# Patient Record
Sex: FEMALE | Race: BLACK OR AFRICAN AMERICAN | NOT HISPANIC OR LATINO | URBAN - METROPOLITAN AREA
[De-identification: names, ages, dates, MRNs, and addresses within clinical notes are randomized per-mention and may not be internally consistent; named-entity substitution may affect disease eponyms.]

---

## 2017-01-05 ENCOUNTER — INPATIENT (INPATIENT)
Facility: HOSPITAL | Age: 27
LOS: 3 days | Discharge: HOME | End: 2017-01-09
Attending: HOSPITALIST | Admitting: HOSPITALIST
Payer: MEDICAID

## 2017-01-05 VITALS
SYSTOLIC BLOOD PRESSURE: 187 MMHG | DIASTOLIC BLOOD PRESSURE: 142 MMHG | TEMPERATURE: 99 F | HEART RATE: 109 BPM | RESPIRATION RATE: 18 BRPM | OXYGEN SATURATION: 100 %

## 2017-01-05 DIAGNOSIS — I77.0 ARTERIOVENOUS FISTULA, ACQUIRED: Chronic | ICD-10-CM

## 2017-01-05 LAB
ALBUMIN SERPL ELPH-MCNC: 3.4 G/DL — SIGNIFICANT CHANGE UP (ref 3.3–5)
ALP SERPL-CCNC: 55 U/L — SIGNIFICANT CHANGE UP (ref 40–120)
ALT FLD-CCNC: 9 U/L — SIGNIFICANT CHANGE UP (ref 4–33)
APPEARANCE UR: SIGNIFICANT CHANGE UP
AST SERPL-CCNC: 15 U/L — SIGNIFICANT CHANGE UP (ref 4–32)
BACTERIA # UR AUTO: SIGNIFICANT CHANGE UP
BASE EXCESS BLDV CALC-SCNC: 2.6 MMOL/L — SIGNIFICANT CHANGE UP
BASOPHILS # BLD AUTO: 0.01 K/UL — SIGNIFICANT CHANGE UP (ref 0–0.2)
BASOPHILS NFR BLD AUTO: 0.1 % — SIGNIFICANT CHANGE UP (ref 0–2)
BILIRUB SERPL-MCNC: 0.3 MG/DL — SIGNIFICANT CHANGE UP (ref 0.2–1.2)
BILIRUB UR-MCNC: NEGATIVE — SIGNIFICANT CHANGE UP
BLOOD GAS VENOUS - CREATININE: 8.1 MG/DL — HIGH (ref 0.5–1.3)
BLOOD UR QL VISUAL: NEGATIVE — SIGNIFICANT CHANGE UP
BUN SERPL-MCNC: 52 MG/DL — HIGH (ref 7–23)
CALCIUM SERPL-MCNC: 8.9 MG/DL — SIGNIFICANT CHANGE UP (ref 8.4–10.5)
CHLORIDE BLDV-SCNC: 101 MMOL/L — SIGNIFICANT CHANGE UP (ref 96–108)
CHLORIDE SERPL-SCNC: 97 MMOL/L — LOW (ref 98–107)
CO2 SERPL-SCNC: 23 MMOL/L — SIGNIFICANT CHANGE UP (ref 22–31)
COLOR SPEC: SIGNIFICANT CHANGE UP
CREAT SERPL-MCNC: 7.74 MG/DL — HIGH (ref 0.5–1.3)
EOSINOPHIL # BLD AUTO: 0.08 K/UL — SIGNIFICANT CHANGE UP (ref 0–0.5)
EOSINOPHIL NFR BLD AUTO: 0.7 % — SIGNIFICANT CHANGE UP (ref 0–6)
GAS PNL BLDV: 135 MMOL/L — LOW (ref 136–146)
GLUCOSE BLDV-MCNC: 75 — SIGNIFICANT CHANGE UP (ref 70–99)
GLUCOSE SERPL-MCNC: 75 MG/DL — SIGNIFICANT CHANGE UP (ref 70–99)
GLUCOSE UR-MCNC: NEGATIVE — SIGNIFICANT CHANGE UP
HCG SERPL-ACNC: < 5 MIU/ML — SIGNIFICANT CHANGE UP
HCO3 BLDV-SCNC: 25 MMOL/L — SIGNIFICANT CHANGE UP (ref 20–27)
HCT VFR BLD CALC: 39.1 % — SIGNIFICANT CHANGE UP (ref 34.5–45)
HCT VFR BLDV CALC: 40.1 % — SIGNIFICANT CHANGE UP (ref 34.5–45)
HGB BLD-MCNC: 12.8 G/DL — SIGNIFICANT CHANGE UP (ref 11.5–15.5)
HGB BLDV-MCNC: 13 G/DL — SIGNIFICANT CHANGE UP (ref 11.5–15.5)
IMM GRANULOCYTES NFR BLD AUTO: 1.3 % — SIGNIFICANT CHANGE UP (ref 0–1.5)
KETONES UR-MCNC: NEGATIVE — SIGNIFICANT CHANGE UP
LACTATE BLDV-MCNC: 1.4 MMOL/L — SIGNIFICANT CHANGE UP (ref 0.5–2)
LEUKOCYTE ESTERASE UR-ACNC: NEGATIVE — SIGNIFICANT CHANGE UP
LIDOCAIN IGE QN: 26.8 U/L — SIGNIFICANT CHANGE UP (ref 7–60)
LYMPHOCYTES # BLD AUTO: 0.9 K/UL — LOW (ref 1–3.3)
LYMPHOCYTES # BLD AUTO: 8 % — LOW (ref 13–44)
MCHC RBC-ENTMCNC: 28.4 PG — SIGNIFICANT CHANGE UP (ref 27–34)
MCHC RBC-ENTMCNC: 32.7 % — SIGNIFICANT CHANGE UP (ref 32–36)
MCV RBC AUTO: 86.7 FL — SIGNIFICANT CHANGE UP (ref 80–100)
MONOCYTES # BLD AUTO: 0.9 K/UL — SIGNIFICANT CHANGE UP (ref 0–0.9)
MONOCYTES NFR BLD AUTO: 8 % — SIGNIFICANT CHANGE UP (ref 2–14)
NEUTROPHILS # BLD AUTO: 9.24 K/UL — HIGH (ref 1.8–7.4)
NEUTROPHILS NFR BLD AUTO: 81.9 % — HIGH (ref 43–77)
NITRITE UR-MCNC: NEGATIVE — SIGNIFICANT CHANGE UP
NON-SQ EPI CELLS # UR AUTO: <1 — SIGNIFICANT CHANGE UP
PCO2 BLDV: 44 MMHG — SIGNIFICANT CHANGE UP (ref 41–51)
PH BLDV: 7.41 PH — SIGNIFICANT CHANGE UP (ref 7.32–7.43)
PH UR: 8 — SIGNIFICANT CHANGE UP (ref 4.6–8)
PLATELET # BLD AUTO: 228 K/UL — SIGNIFICANT CHANGE UP (ref 150–400)
PMV BLD: 9 FL — SIGNIFICANT CHANGE UP (ref 7–13)
PO2 BLDV: 27 MMHG — LOW (ref 35–40)
POTASSIUM BLDV-SCNC: 3.8 MMOL/L — SIGNIFICANT CHANGE UP (ref 3.4–4.5)
POTASSIUM SERPL-MCNC: 4.5 MMOL/L — SIGNIFICANT CHANGE UP (ref 3.5–5.3)
POTASSIUM SERPL-SCNC: 4.5 MMOL/L — SIGNIFICANT CHANGE UP (ref 3.5–5.3)
PROT SERPL-MCNC: 5.7 G/DL — LOW (ref 6–8.3)
PROT UR-MCNC: 500 — HIGH
RBC # BLD: 4.51 M/UL — SIGNIFICANT CHANGE UP (ref 3.8–5.2)
RBC # FLD: 16.9 % — HIGH (ref 10.3–14.5)
RBC CASTS # UR COMP ASSIST: SIGNIFICANT CHANGE UP (ref 0–?)
SAO2 % BLDV: 43.3 % — LOW (ref 60–85)
SODIUM SERPL-SCNC: 141 MMOL/L — SIGNIFICANT CHANGE UP (ref 135–145)
SP GR SPEC: 1.01 — SIGNIFICANT CHANGE UP (ref 1–1.03)
SQUAMOUS # UR AUTO: SIGNIFICANT CHANGE UP
TSH SERPL-MCNC: 0.9 UIU/ML — SIGNIFICANT CHANGE UP (ref 0.27–4.2)
UROBILINOGEN FLD QL: NORMAL E.U. — SIGNIFICANT CHANGE UP (ref 0.1–0.2)
WBC # BLD: 11.28 K/UL — HIGH (ref 3.8–10.5)
WBC # FLD AUTO: 11.28 K/UL — HIGH (ref 3.8–10.5)
WBC UR QL: SIGNIFICANT CHANGE UP (ref 0–?)

## 2017-01-05 RX ORDER — SODIUM CHLORIDE 9 MG/ML
1000 INJECTION INTRAMUSCULAR; INTRAVENOUS; SUBCUTANEOUS ONCE
Qty: 0 | Refills: 0 | Status: COMPLETED | OUTPATIENT
Start: 2017-01-05 | End: 2017-01-05

## 2017-01-05 RX ORDER — LABETALOL HCL 100 MG
10 TABLET ORAL ONCE
Qty: 0 | Refills: 0 | Status: COMPLETED | OUTPATIENT
Start: 2017-01-05 | End: 2017-01-05

## 2017-01-05 RX ORDER — ONDANSETRON 8 MG/1
4 TABLET, FILM COATED ORAL ONCE
Qty: 0 | Refills: 0 | Status: COMPLETED | OUTPATIENT
Start: 2017-01-05 | End: 2017-01-05

## 2017-01-05 RX ORDER — HYDROMORPHONE HYDROCHLORIDE 2 MG/ML
1 INJECTION INTRAMUSCULAR; INTRAVENOUS; SUBCUTANEOUS ONCE
Qty: 0 | Refills: 0 | Status: DISCONTINUED | OUTPATIENT
Start: 2017-01-05 | End: 2017-01-05

## 2017-01-05 RX ADMIN — HYDROMORPHONE HYDROCHLORIDE 1 MILLIGRAM(S): 2 INJECTION INTRAMUSCULAR; INTRAVENOUS; SUBCUTANEOUS at 22:30

## 2017-01-05 RX ADMIN — SODIUM CHLORIDE 1000 MILLILITER(S): 9 INJECTION INTRAMUSCULAR; INTRAVENOUS; SUBCUTANEOUS at 22:10

## 2017-01-05 RX ADMIN — Medication 10 MILLIGRAM(S): at 23:54

## 2017-01-05 RX ADMIN — ONDANSETRON 4 MILLIGRAM(S): 8 TABLET, FILM COATED ORAL at 22:10

## 2017-01-05 RX ADMIN — HYDROMORPHONE HYDROCHLORIDE 1 MILLIGRAM(S): 2 INJECTION INTRAMUSCULAR; INTRAVENOUS; SUBCUTANEOUS at 22:12

## 2017-01-05 NOTE — ED PROVIDER NOTE - PROGRESS NOTE DETAILS
Spoke with nephrology on call to inform then the patient missed her dialysis appointment today as the patient is followed by house renal. Nephrology is aware and will dialyze patient inpatient.

## 2017-01-05 NOTE — ED PROVIDER NOTE - MEDICAL DECISION MAKING DETAILS
27 yo female with PMH of multiple c. diff colitis, lupus nephropathy, lupus, presents to the ED with three days of copious diarrhea, nausea, vomiting, body aches, and malaise. Initial plan: CXR upright, KUB, labs, IV fluids, pain control, zofran, UA, c. diff culture

## 2017-01-05 NOTE — ED PROVIDER NOTE - ATTENDING CONTRIBUTION TO CARE
Pt was seen and evaluated by me. Pt states having a history of ESRD secondary to SLE on HD TuThSat with recent diarrhea over the past 2 days. Pt states over the past 2 days having abd pain with watery diarrhea consistent with previous episodes of C.diff. Pt also notes she missed dialysis today. Pt denies any fever, chills, SOB, or chest pain. Pt admits to lower abd pain with episodes of nausea and vomiting. Lungs CTA b/l. RRR. Abd soft, non-distended. + thrill to right forearm.

## 2017-01-05 NOTE — ED ADULT NURSE NOTE - OBJECTIVE STATEMENT
rec'd pt aaox3 in room 9 c/o 10/10 abd pain, states it started on Tuesday but has progressively worsened this morning, pt. reports 5 episodes of loose watery stool and 4 episodes of vomiting, states she's has this kind of pain in the past and was previously dx w/ c diff and placed on antibiotics.  Pt. denies CP/SOB, reports more frequent urination that usual.  Pt. w/ an AV fistula in the R arm, gets dialysis on T, TH, Sat, missed her dialysis treatment this morning.  Pt. presently hypertensive, states she takes numerous meds for HBP but was not able to hold them down this morning.  Pt. dx w/ lupus, states she's complaint w/ her meds but feels like she may be having a flare up.  20G IV saline lock placed in the L AC, labs drawn and sent as ordered.  Will continue to monitor.

## 2017-01-05 NOTE — ED PROVIDER NOTE - OBJECTIVE STATEMENT
25 yo female with PMH of multiple c. diff colitis, lupus nephropathy, lupus, presents to the ED with three days of copious diarrhea, nausea, vomiting, body aches, and malaise. She states she began having cramping abdominal pain on Tuesday that progressed to diarrhea, nausea, vomiting, back pain and pain of multiple joints that "feels like a mild lupus flair up." Patient denies LOC, chest pain, cough, palpitations, SOB, headache, trauma, ingestion of suspicious foods, recent travel, sick contacts, blood per mouth or rectum, constipation.

## 2017-01-05 NOTE — ED PROVIDER NOTE - CHPI ED SYMPTOMS POS
DEHYDRATION/EDEMA/DIARRHEA/TENDERNESS/CRAMPS/WEAKNESS/PAIN/ABDOMINAL DISTENSION/CHILLS/LOSS OF APPETITE/DECREASED EATING/DRINKING/NAUSEA/VOMITING

## 2017-01-05 NOTE — ED ADULT TRIAGE NOTE - CHIEF COMPLAINT QUOTE
pt has had cdiff multiple times, last episode in september. c.o abdominal pain, diarrhea, vomiting this week. states diarrhea has become uncontrollable and feels similar to past cdiff. bp elevated in triage, pt on dialysis, T TH SAT, missed treatment tonight.  pmh lupus, kidney failure.

## 2017-01-05 NOTE — ED PROVIDER NOTE - ABDOMINAL TENDER
right lower quadrant/right upper quadrant/left upper quadrant/periumbilical/left lower quadrant/epigastric

## 2017-01-05 NOTE — ED PROVIDER NOTE - PMH
ESRD (end stage renal disease) on dialysis  2/2 microangiopathy  Essential Hypertension, Benign    Lupus nephritis    Microangiopathy    Systemic Lupus Erythematosus

## 2017-01-05 NOTE — ED PROVIDER NOTE - CHPI ED SYMPTOMS NEG
no coffee grounds emesis/no burning urination/no rectal bleeding/no dark tarry stools/no bloody emesis/no melena/no constipation/no dysuria/no blood in stool/no hematuria

## 2017-01-06 DIAGNOSIS — I16.0 HYPERTENSIVE URGENCY: ICD-10-CM

## 2017-01-06 DIAGNOSIS — N18.6 END STAGE RENAL DISEASE: ICD-10-CM

## 2017-01-06 DIAGNOSIS — A04.7 ENTEROCOLITIS DUE TO CLOSTRIDIUM DIFFICILE: ICD-10-CM

## 2017-01-06 DIAGNOSIS — M32.9 SYSTEMIC LUPUS ERYTHEMATOSUS, UNSPECIFIED: ICD-10-CM

## 2017-01-06 DIAGNOSIS — K52.9 NONINFECTIVE GASTROENTERITIS AND COLITIS, UNSPECIFIED: ICD-10-CM

## 2017-01-06 LAB
ALBUMIN SERPL ELPH-MCNC: 3.5 G/DL — SIGNIFICANT CHANGE UP (ref 3.3–5)
ALP SERPL-CCNC: 57 U/L — SIGNIFICANT CHANGE UP (ref 40–120)
ALT FLD-CCNC: 6 U/L — SIGNIFICANT CHANGE UP (ref 4–33)
AST SERPL-CCNC: 14 U/L — SIGNIFICANT CHANGE UP (ref 4–32)
BASOPHILS # BLD AUTO: 0.04 K/UL — SIGNIFICANT CHANGE UP (ref 0–0.2)
BASOPHILS NFR BLD AUTO: 0.3 % — SIGNIFICANT CHANGE UP (ref 0–2)
BILIRUB SERPL-MCNC: 0.3 MG/DL — SIGNIFICANT CHANGE UP (ref 0.2–1.2)
BUN SERPL-MCNC: 55 MG/DL — HIGH (ref 7–23)
C DIFF TOX GENS STL QL NAA+PROBE: DETECTED — HIGH
CALCIUM SERPL-MCNC: 9.3 MG/DL — SIGNIFICANT CHANGE UP (ref 8.4–10.5)
CHLORIDE SERPL-SCNC: 99 MMOL/L — SIGNIFICANT CHANGE UP (ref 98–107)
CO2 SERPL-SCNC: 23 MMOL/L — SIGNIFICANT CHANGE UP (ref 22–31)
CREAT SERPL-MCNC: 8 MG/DL — HIGH (ref 0.5–1.3)
EOSINOPHIL # BLD AUTO: 0.07 K/UL — SIGNIFICANT CHANGE UP (ref 0–0.5)
EOSINOPHIL NFR BLD AUTO: 0.5 % — SIGNIFICANT CHANGE UP (ref 0–6)
GLUCOSE SERPL-MCNC: 80 MG/DL — SIGNIFICANT CHANGE UP (ref 70–99)
HCT VFR BLD CALC: 39.2 % — SIGNIFICANT CHANGE UP (ref 34.5–45)
HGB BLD-MCNC: 12.7 G/DL — SIGNIFICANT CHANGE UP (ref 11.5–15.5)
IMM GRANULOCYTES NFR BLD AUTO: 1.1 % — SIGNIFICANT CHANGE UP (ref 0–1.5)
LYMPHOCYTES # BLD AUTO: 1.24 K/UL — SIGNIFICANT CHANGE UP (ref 1–3.3)
LYMPHOCYTES # BLD AUTO: 8.2 % — LOW (ref 13–44)
MAGNESIUM SERPL-MCNC: 2.1 MG/DL — SIGNIFICANT CHANGE UP (ref 1.6–2.6)
MCHC RBC-ENTMCNC: 28.2 PG — SIGNIFICANT CHANGE UP (ref 27–34)
MCHC RBC-ENTMCNC: 32.4 % — SIGNIFICANT CHANGE UP (ref 32–36)
MCV RBC AUTO: 86.9 FL — SIGNIFICANT CHANGE UP (ref 80–100)
MONOCYTES # BLD AUTO: 1.52 K/UL — HIGH (ref 0–0.9)
MONOCYTES NFR BLD AUTO: 10.1 % — SIGNIFICANT CHANGE UP (ref 2–14)
NEUTROPHILS # BLD AUTO: 12.03 K/UL — HIGH (ref 1.8–7.4)
NEUTROPHILS NFR BLD AUTO: 79.8 % — HIGH (ref 43–77)
PHOSPHATE SERPL-MCNC: 4.6 MG/DL — HIGH (ref 2.5–4.5)
PLATELET # BLD AUTO: 232 K/UL — SIGNIFICANT CHANGE UP (ref 150–400)
PMV BLD: 9.7 FL — SIGNIFICANT CHANGE UP (ref 7–13)
POTASSIUM SERPL-MCNC: 4 MMOL/L — SIGNIFICANT CHANGE UP (ref 3.5–5.3)
POTASSIUM SERPL-SCNC: 4 MMOL/L — SIGNIFICANT CHANGE UP (ref 3.5–5.3)
PROT SERPL-MCNC: 5.8 G/DL — LOW (ref 6–8.3)
RBC # BLD: 4.51 M/UL — SIGNIFICANT CHANGE UP (ref 3.8–5.2)
RBC # FLD: 17.1 % — HIGH (ref 10.3–14.5)
SODIUM SERPL-SCNC: 145 MMOL/L — SIGNIFICANT CHANGE UP (ref 135–145)
WBC # BLD: 15.07 K/UL — HIGH (ref 3.8–10.5)
WBC # FLD AUTO: 15.07 K/UL — HIGH (ref 3.8–10.5)

## 2017-01-06 PROCEDURE — 99233 SBSQ HOSP IP/OBS HIGH 50: CPT | Mod: GC

## 2017-01-06 PROCEDURE — 99223 1ST HOSP IP/OBS HIGH 75: CPT

## 2017-01-06 PROCEDURE — 74010: CPT | Mod: 26

## 2017-01-06 PROCEDURE — 71010: CPT | Mod: 26

## 2017-01-06 RX ORDER — ONDANSETRON 8 MG/1
4 TABLET, FILM COATED ORAL ONCE
Qty: 0 | Refills: 0 | Status: COMPLETED | OUTPATIENT
Start: 2017-01-06 | End: 2017-01-06

## 2017-01-06 RX ORDER — HYDRALAZINE HCL 50 MG
100 TABLET ORAL
Qty: 0 | Refills: 0 | Status: DISCONTINUED | OUTPATIENT
Start: 2017-01-06 | End: 2017-01-07

## 2017-01-06 RX ORDER — FOLIC ACID 0.8 MG
1 TABLET ORAL DAILY
Qty: 0 | Refills: 0 | Status: DISCONTINUED | OUTPATIENT
Start: 2017-01-06 | End: 2017-01-09

## 2017-01-06 RX ORDER — HYDROXYCHLOROQUINE SULFATE 200 MG
200 TABLET ORAL DAILY
Qty: 0 | Refills: 0 | Status: DISCONTINUED | OUTPATIENT
Start: 2017-01-06 | End: 2017-01-09

## 2017-01-06 RX ORDER — LABETALOL HCL 100 MG
200 TABLET ORAL
Qty: 0 | Refills: 0 | Status: DISCONTINUED | OUTPATIENT
Start: 2017-01-06 | End: 2017-01-09

## 2017-01-06 RX ORDER — VANCOMYCIN HCL 1 G
500 VIAL (EA) INTRAVENOUS ONCE
Qty: 0 | Refills: 0 | Status: COMPLETED | OUTPATIENT
Start: 2017-01-06 | End: 2017-01-06

## 2017-01-06 RX ORDER — VANCOMYCIN HCL 1 G
125 VIAL (EA) INTRAVENOUS EVERY 6 HOURS
Qty: 0 | Refills: 0 | Status: DISCONTINUED | OUTPATIENT
Start: 2017-01-06 | End: 2017-01-09

## 2017-01-06 RX ORDER — PANTOPRAZOLE SODIUM 20 MG/1
40 TABLET, DELAYED RELEASE ORAL
Qty: 0 | Refills: 0 | Status: DISCONTINUED | OUTPATIENT
Start: 2017-01-06 | End: 2017-01-09

## 2017-01-06 RX ORDER — HYDRALAZINE HCL 50 MG
10 TABLET ORAL ONCE
Qty: 0 | Refills: 0 | Status: COMPLETED | OUTPATIENT
Start: 2017-01-06 | End: 2017-01-06

## 2017-01-06 RX ORDER — HYDRALAZINE HCL 50 MG
10 TABLET ORAL ONCE
Qty: 0 | Refills: 0 | Status: DISCONTINUED | OUTPATIENT
Start: 2017-01-06 | End: 2017-01-08

## 2017-01-06 RX ORDER — HYDROMORPHONE HYDROCHLORIDE 2 MG/ML
1 INJECTION INTRAMUSCULAR; INTRAVENOUS; SUBCUTANEOUS EVERY 4 HOURS
Qty: 0 | Refills: 0 | Status: DISCONTINUED | OUTPATIENT
Start: 2017-01-06 | End: 2017-01-08

## 2017-01-06 RX ORDER — HYDROMORPHONE HYDROCHLORIDE 2 MG/ML
2 INJECTION INTRAMUSCULAR; INTRAVENOUS; SUBCUTANEOUS ONCE
Qty: 0 | Refills: 0 | Status: DISCONTINUED | OUTPATIENT
Start: 2017-01-06 | End: 2017-01-06

## 2017-01-06 RX ORDER — ONDANSETRON 8 MG/1
4 TABLET, FILM COATED ORAL EVERY 4 HOURS
Qty: 0 | Refills: 0 | Status: DISCONTINUED | OUTPATIENT
Start: 2017-01-06 | End: 2017-01-09

## 2017-01-06 RX ORDER — HYDROMORPHONE HYDROCHLORIDE 2 MG/ML
1 INJECTION INTRAMUSCULAR; INTRAVENOUS; SUBCUTANEOUS ONCE
Qty: 0 | Refills: 0 | Status: DISCONTINUED | OUTPATIENT
Start: 2017-01-06 | End: 2017-01-06

## 2017-01-06 RX ORDER — NIFEDIPINE 30 MG
90 TABLET, EXTENDED RELEASE 24 HR ORAL DAILY
Qty: 0 | Refills: 0 | Status: DISCONTINUED | OUTPATIENT
Start: 2017-01-06 | End: 2017-01-09

## 2017-01-06 RX ORDER — DIPHENHYDRAMINE HCL 50 MG
25 CAPSULE ORAL EVERY 6 HOURS
Qty: 0 | Refills: 0 | Status: DISCONTINUED | OUTPATIENT
Start: 2017-01-06 | End: 2017-01-09

## 2017-01-06 RX ORDER — HYDROMORPHONE HYDROCHLORIDE 2 MG/ML
2 INJECTION INTRAMUSCULAR; INTRAVENOUS; SUBCUTANEOUS EVERY 4 HOURS
Qty: 0 | Refills: 0 | Status: DISCONTINUED | OUTPATIENT
Start: 2017-01-06 | End: 2017-01-08

## 2017-01-06 RX ADMIN — HYDROMORPHONE HYDROCHLORIDE 2 MILLIGRAM(S): 2 INJECTION INTRAMUSCULAR; INTRAVENOUS; SUBCUTANEOUS at 07:47

## 2017-01-06 RX ADMIN — Medication 500 MILLIGRAM(S): at 02:04

## 2017-01-06 RX ADMIN — ONDANSETRON 4 MILLIGRAM(S): 8 TABLET, FILM COATED ORAL at 15:32

## 2017-01-06 RX ADMIN — PANTOPRAZOLE SODIUM 40 MILLIGRAM(S): 20 TABLET, DELAYED RELEASE ORAL at 07:47

## 2017-01-06 RX ADMIN — ONDANSETRON 4 MILLIGRAM(S): 8 TABLET, FILM COATED ORAL at 20:41

## 2017-01-06 RX ADMIN — Medication 25 MILLIGRAM(S): at 23:57

## 2017-01-06 RX ADMIN — HYDROMORPHONE HYDROCHLORIDE 1 MILLIGRAM(S): 2 INJECTION INTRAMUSCULAR; INTRAVENOUS; SUBCUTANEOUS at 10:27

## 2017-01-06 RX ADMIN — ONDANSETRON 4 MILLIGRAM(S): 8 TABLET, FILM COATED ORAL at 10:16

## 2017-01-06 RX ADMIN — Medication 100 MILLIGRAM(S): at 07:47

## 2017-01-06 RX ADMIN — Medication 125 MILLIGRAM(S): at 23:01

## 2017-01-06 RX ADMIN — Medication 125 MILLIGRAM(S): at 08:11

## 2017-01-06 RX ADMIN — Medication 200 MILLIGRAM(S): at 11:27

## 2017-01-06 RX ADMIN — Medication 200 MILLIGRAM(S): at 20:35

## 2017-01-06 RX ADMIN — HYDROMORPHONE HYDROCHLORIDE 2 MILLIGRAM(S): 2 INJECTION INTRAMUSCULAR; INTRAVENOUS; SUBCUTANEOUS at 02:30

## 2017-01-06 RX ADMIN — Medication 10 MILLIGRAM(S): at 11:26

## 2017-01-06 RX ADMIN — Medication 90 MILLIGRAM(S): at 07:48

## 2017-01-06 RX ADMIN — HYDROMORPHONE HYDROCHLORIDE 2 MILLIGRAM(S): 2 INJECTION INTRAMUSCULAR; INTRAVENOUS; SUBCUTANEOUS at 19:44

## 2017-01-06 RX ADMIN — HYDROMORPHONE HYDROCHLORIDE 2 MILLIGRAM(S): 2 INJECTION INTRAMUSCULAR; INTRAVENOUS; SUBCUTANEOUS at 15:45

## 2017-01-06 RX ADMIN — Medication 1 MILLIGRAM(S): at 11:27

## 2017-01-06 RX ADMIN — HYDROMORPHONE HYDROCHLORIDE 2 MILLIGRAM(S): 2 INJECTION INTRAMUSCULAR; INTRAVENOUS; SUBCUTANEOUS at 23:01

## 2017-01-06 RX ADMIN — Medication 200 MILLIGRAM(S): at 07:48

## 2017-01-06 RX ADMIN — Medication 125 MILLIGRAM(S): at 12:54

## 2017-01-06 RX ADMIN — Medication 25 MILLIGRAM(S): at 07:47

## 2017-01-06 RX ADMIN — Medication 8 MILLIGRAM(S): at 09:29

## 2017-01-06 RX ADMIN — HYDROMORPHONE HYDROCHLORIDE 2 MILLIGRAM(S): 2 INJECTION INTRAMUSCULAR; INTRAVENOUS; SUBCUTANEOUS at 08:29

## 2017-01-06 RX ADMIN — HYDROMORPHONE HYDROCHLORIDE 2 MILLIGRAM(S): 2 INJECTION INTRAMUSCULAR; INTRAVENOUS; SUBCUTANEOUS at 15:32

## 2017-01-06 RX ADMIN — HYDROMORPHONE HYDROCHLORIDE 2 MILLIGRAM(S): 2 INJECTION INTRAMUSCULAR; INTRAVENOUS; SUBCUTANEOUS at 20:00

## 2017-01-06 RX ADMIN — ONDANSETRON 4 MILLIGRAM(S): 8 TABLET, FILM COATED ORAL at 07:46

## 2017-01-06 RX ADMIN — Medication 100 MILLIGRAM(S): at 20:36

## 2017-01-06 RX ADMIN — HYDROMORPHONE HYDROCHLORIDE 1 MILLIGRAM(S): 2 INJECTION INTRAMUSCULAR; INTRAVENOUS; SUBCUTANEOUS at 10:16

## 2017-01-06 RX ADMIN — HYDROMORPHONE HYDROCHLORIDE 2 MILLIGRAM(S): 2 INJECTION INTRAMUSCULAR; INTRAVENOUS; SUBCUTANEOUS at 22:31

## 2017-01-06 RX ADMIN — HYDROMORPHONE HYDROCHLORIDE 2 MILLIGRAM(S): 2 INJECTION INTRAMUSCULAR; INTRAVENOUS; SUBCUTANEOUS at 01:57

## 2017-01-06 NOTE — H&P ADULT. - PROBLEM SELECTOR PLAN 2
- 2/2 missed dose home meds, pain and possible overload from missed HD  - Monitor BP  - Pain control  - Restart home meds. will give dose of zofran prior to administering PO meds to heklp pt tolerate

## 2017-01-06 NOTE — ED ADULT NURSE REASSESSMENT NOTE - NS ED NURSE REASSESS COMMENT FT1
Break Coverage RN: Patient c/o abdominal pain with N/V x1. Noted to be hypertensive. ADS NP made aware. Medicated as per orders for pain and nausea.  Report given to dialysis RN Shannan Rowland. Awaiting transport. Will monitor.

## 2017-01-06 NOTE — H&P ADULT. - PROBLEM SELECTOR PLAN 1
- Continue PO vancomycin  - Dilaudid as needed for abdominal pain  - Clear liquid diet, advance as tolerated  - Consider ID consult

## 2017-01-06 NOTE — H&P ADULT. - HISTORY OF PRESENT ILLNESS
27 yo F with /o of SLE, ESRD 2/2 lupus nephropathy on HD, HTN, multiple prior epsiodes of c diff p/w abdominal pain and diarrhea x 3 days. Pt states that she initially had 10/10 cramy abdominal pain in epigastrium, worse with food that eventually became diffuse the next day, slightly improved when in fetal position. Sghe had associated NBNB vomiting and large volume water diarrhea with hematochezia or melena. States that it feels similar to prior episodes of c diff colitis. She has been having chills but no fevers, no sick contacts, no change in diet. No recent antibiotic use. She was also having diffuse joint and back pain that was initially concerning for "mild lupus flare" but this has now improved. She has not been able to tolerate any PO and did not taken her home medications yesterday and missed her HD session.     ED VS: 187/142  109  98.9  18  100% on RA   Pt was given dilaudid 1mg then 2mg IV, zofran 4mg IV, labetalol 10mg IV, vancomycin 500mg PO and 1L N bolus

## 2017-01-06 NOTE — H&P ADULT. - ASSESSMENT
25 yo F with /o of SLE, ESRD 2/2 lupus nephropathy on HD, HTN, multiple prior epsiodes of c diff p/w abdominal pain and diarrhea x 3 days. Found to be c diff positive

## 2017-01-07 LAB
ALBUMIN SERPL ELPH-MCNC: 3 G/DL — LOW (ref 3.3–5)
ALP SERPL-CCNC: 46 U/L — SIGNIFICANT CHANGE UP (ref 40–120)
ALT FLD-CCNC: 5 U/L — SIGNIFICANT CHANGE UP (ref 4–33)
AST SERPL-CCNC: 12 U/L — SIGNIFICANT CHANGE UP (ref 4–32)
BASOPHILS # BLD AUTO: 0.01 K/UL — SIGNIFICANT CHANGE UP (ref 0–0.2)
BASOPHILS NFR BLD AUTO: 0.1 % — SIGNIFICANT CHANGE UP (ref 0–2)
BILIRUB SERPL-MCNC: 0.3 MG/DL — SIGNIFICANT CHANGE UP (ref 0.2–1.2)
BUN SERPL-MCNC: 26 MG/DL — HIGH (ref 7–23)
CALCIUM SERPL-MCNC: 8.6 MG/DL — SIGNIFICANT CHANGE UP (ref 8.4–10.5)
CHLORIDE SERPL-SCNC: 99 MMOL/L — SIGNIFICANT CHANGE UP (ref 98–107)
CO2 SERPL-SCNC: 26 MMOL/L — SIGNIFICANT CHANGE UP (ref 22–31)
CREAT SERPL-MCNC: 4.63 MG/DL — HIGH (ref 0.5–1.3)
EOSINOPHIL # BLD AUTO: 0.06 K/UL — SIGNIFICANT CHANGE UP (ref 0–0.5)
EOSINOPHIL NFR BLD AUTO: 0.8 % — SIGNIFICANT CHANGE UP (ref 0–6)
GLUCOSE SERPL-MCNC: 65 MG/DL — LOW (ref 70–99)
HCT VFR BLD CALC: 31 % — LOW (ref 34.5–45)
HGB BLD-MCNC: 10 G/DL — LOW (ref 11.5–15.5)
IMM GRANULOCYTES NFR BLD AUTO: 0.8 % — SIGNIFICANT CHANGE UP (ref 0–1.5)
LYMPHOCYTES # BLD AUTO: 0.91 K/UL — LOW (ref 1–3.3)
LYMPHOCYTES # BLD AUTO: 12.2 % — LOW (ref 13–44)
MCHC RBC-ENTMCNC: 28.2 PG — SIGNIFICANT CHANGE UP (ref 27–34)
MCHC RBC-ENTMCNC: 32.3 % — SIGNIFICANT CHANGE UP (ref 32–36)
MCV RBC AUTO: 87.6 FL — SIGNIFICANT CHANGE UP (ref 80–100)
MONOCYTES # BLD AUTO: 1.05 K/UL — HIGH (ref 0–0.9)
MONOCYTES NFR BLD AUTO: 14.1 % — HIGH (ref 2–14)
NEUTROPHILS # BLD AUTO: 5.35 K/UL — SIGNIFICANT CHANGE UP (ref 1.8–7.4)
NEUTROPHILS NFR BLD AUTO: 72 % — SIGNIFICANT CHANGE UP (ref 43–77)
PLATELET # BLD AUTO: 164 K/UL — SIGNIFICANT CHANGE UP (ref 150–400)
PMV BLD: 9.6 FL — SIGNIFICANT CHANGE UP (ref 7–13)
POTASSIUM SERPL-MCNC: 4.2 MMOL/L — SIGNIFICANT CHANGE UP (ref 3.5–5.3)
POTASSIUM SERPL-SCNC: 4.2 MMOL/L — SIGNIFICANT CHANGE UP (ref 3.5–5.3)
PROT SERPL-MCNC: 4.9 G/DL — LOW (ref 6–8.3)
RBC # BLD: 3.54 M/UL — LOW (ref 3.8–5.2)
RBC # FLD: 17.2 % — HIGH (ref 10.3–14.5)
SODIUM SERPL-SCNC: 141 MMOL/L — SIGNIFICANT CHANGE UP (ref 135–145)
SPECIMEN SOURCE: SIGNIFICANT CHANGE UP
WBC # BLD: 7.44 K/UL — SIGNIFICANT CHANGE UP (ref 3.8–10.5)
WBC # FLD AUTO: 7.44 K/UL — SIGNIFICANT CHANGE UP (ref 3.8–10.5)

## 2017-01-07 PROCEDURE — 99232 SBSQ HOSP IP/OBS MODERATE 35: CPT | Mod: GC

## 2017-01-07 PROCEDURE — 99233 SBSQ HOSP IP/OBS HIGH 50: CPT

## 2017-01-07 RX ORDER — HYDRALAZINE HCL 50 MG
100 TABLET ORAL THREE TIMES A DAY
Qty: 0 | Refills: 0 | Status: DISCONTINUED | OUTPATIENT
Start: 2017-01-07 | End: 2017-01-09

## 2017-01-07 RX ADMIN — HYDROMORPHONE HYDROCHLORIDE 2 MILLIGRAM(S): 2 INJECTION INTRAMUSCULAR; INTRAVENOUS; SUBCUTANEOUS at 08:56

## 2017-01-07 RX ADMIN — HYDROMORPHONE HYDROCHLORIDE 2 MILLIGRAM(S): 2 INJECTION INTRAMUSCULAR; INTRAVENOUS; SUBCUTANEOUS at 20:33

## 2017-01-07 RX ADMIN — Medication 125 MILLIGRAM(S): at 05:41

## 2017-01-07 RX ADMIN — HYDROMORPHONE HYDROCHLORIDE 2 MILLIGRAM(S): 2 INJECTION INTRAMUSCULAR; INTRAVENOUS; SUBCUTANEOUS at 12:01

## 2017-01-07 RX ADMIN — HYDROMORPHONE HYDROCHLORIDE 2 MILLIGRAM(S): 2 INJECTION INTRAMUSCULAR; INTRAVENOUS; SUBCUTANEOUS at 13:01

## 2017-01-07 RX ADMIN — ONDANSETRON 4 MILLIGRAM(S): 8 TABLET, FILM COATED ORAL at 18:32

## 2017-01-07 RX ADMIN — ONDANSETRON 4 MILLIGRAM(S): 8 TABLET, FILM COATED ORAL at 05:41

## 2017-01-07 RX ADMIN — HYDROMORPHONE HYDROCHLORIDE 2 MILLIGRAM(S): 2 INJECTION INTRAMUSCULAR; INTRAVENOUS; SUBCUTANEOUS at 20:48

## 2017-01-07 RX ADMIN — HYDROMORPHONE HYDROCHLORIDE 2 MILLIGRAM(S): 2 INJECTION INTRAMUSCULAR; INTRAVENOUS; SUBCUTANEOUS at 07:56

## 2017-01-07 RX ADMIN — Medication 25 MILLIGRAM(S): at 07:56

## 2017-01-07 RX ADMIN — Medication 200 MILLIGRAM(S): at 18:31

## 2017-01-07 RX ADMIN — Medication 100 MILLIGRAM(S): at 14:17

## 2017-01-07 RX ADMIN — Medication 90 MILLIGRAM(S): at 12:01

## 2017-01-07 RX ADMIN — Medication 100 MILLIGRAM(S): at 22:17

## 2017-01-07 RX ADMIN — Medication 8 MILLIGRAM(S): at 12:01

## 2017-01-07 RX ADMIN — Medication 25 MILLIGRAM(S): at 20:39

## 2017-01-07 RX ADMIN — HYDROMORPHONE HYDROCHLORIDE 2 MILLIGRAM(S): 2 INJECTION INTRAMUSCULAR; INTRAVENOUS; SUBCUTANEOUS at 03:45

## 2017-01-07 RX ADMIN — PANTOPRAZOLE SODIUM 40 MILLIGRAM(S): 20 TABLET, DELAYED RELEASE ORAL at 10:27

## 2017-01-07 RX ADMIN — HYDROMORPHONE HYDROCHLORIDE 2 MILLIGRAM(S): 2 INJECTION INTRAMUSCULAR; INTRAVENOUS; SUBCUTANEOUS at 16:16

## 2017-01-07 RX ADMIN — Medication 100 MILLIGRAM(S): at 05:41

## 2017-01-07 RX ADMIN — HYDROMORPHONE HYDROCHLORIDE 2 MILLIGRAM(S): 2 INJECTION INTRAMUSCULAR; INTRAVENOUS; SUBCUTANEOUS at 02:54

## 2017-01-07 RX ADMIN — HYDROMORPHONE HYDROCHLORIDE 2 MILLIGRAM(S): 2 INJECTION INTRAMUSCULAR; INTRAVENOUS; SUBCUTANEOUS at 17:16

## 2017-01-07 RX ADMIN — Medication 125 MILLIGRAM(S): at 18:32

## 2017-01-07 RX ADMIN — Medication 200 MILLIGRAM(S): at 12:25

## 2017-01-07 RX ADMIN — Medication 1 MILLIGRAM(S): at 12:00

## 2017-01-07 RX ADMIN — Medication 125 MILLIGRAM(S): at 12:25

## 2017-01-07 RX ADMIN — Medication 200 MILLIGRAM(S): at 05:41

## 2017-01-07 RX ADMIN — ONDANSETRON 4 MILLIGRAM(S): 8 TABLET, FILM COATED ORAL at 12:00

## 2017-01-08 LAB
-  CEFAZOLIN: SIGNIFICANT CHANGE UP
-  CIPROFLOXACIN: SIGNIFICANT CHANGE UP
-  GENTAMICIN: SIGNIFICANT CHANGE UP
-  OXACILLIN: SIGNIFICANT CHANGE UP
-  RIFAMPIN.: SIGNIFICANT CHANGE UP
-  TETRACYCLINE: SIGNIFICANT CHANGE UP
-  TRIMETHOPRIM/SULFAMETHOXAZOLE: SIGNIFICANT CHANGE UP
-  VANCOMYCIN: SIGNIFICANT CHANGE UP
ALBUMIN SERPL ELPH-MCNC: 3.2 G/DL — LOW (ref 3.3–5)
ALP SERPL-CCNC: 52 U/L — SIGNIFICANT CHANGE UP (ref 40–120)
ALT FLD-CCNC: 6 U/L — SIGNIFICANT CHANGE UP (ref 4–33)
AST SERPL-CCNC: 16 U/L — SIGNIFICANT CHANGE UP (ref 4–32)
BACTERIA UR CULT: SIGNIFICANT CHANGE UP
BASOPHILS # BLD AUTO: 0.02 K/UL — SIGNIFICANT CHANGE UP (ref 0–0.2)
BASOPHILS NFR BLD AUTO: 0.3 % — SIGNIFICANT CHANGE UP (ref 0–2)
BILIRUB SERPL-MCNC: 0.3 MG/DL — SIGNIFICANT CHANGE UP (ref 0.2–1.2)
BUN SERPL-MCNC: 35 MG/DL — HIGH (ref 7–23)
CALCIUM SERPL-MCNC: 8.9 MG/DL — SIGNIFICANT CHANGE UP (ref 8.4–10.5)
CHLORIDE SERPL-SCNC: 96 MMOL/L — LOW (ref 98–107)
CO2 SERPL-SCNC: 24 MMOL/L — SIGNIFICANT CHANGE UP (ref 22–31)
CREAT SERPL-MCNC: 6.34 MG/DL — HIGH (ref 0.5–1.3)
EOSINOPHIL # BLD AUTO: 0.06 K/UL — SIGNIFICANT CHANGE UP (ref 0–0.5)
EOSINOPHIL NFR BLD AUTO: 1 % — SIGNIFICANT CHANGE UP (ref 0–6)
GLUCOSE SERPL-MCNC: 69 MG/DL — LOW (ref 70–99)
HCT VFR BLD CALC: 31.9 % — LOW (ref 34.5–45)
HGB BLD-MCNC: 10 G/DL — LOW (ref 11.5–15.5)
IMM GRANULOCYTES NFR BLD AUTO: 0.8 % — SIGNIFICANT CHANGE UP (ref 0–1.5)
LYMPHOCYTES # BLD AUTO: 1.01 K/UL — SIGNIFICANT CHANGE UP (ref 1–3.3)
LYMPHOCYTES # BLD AUTO: 16.7 % — SIGNIFICANT CHANGE UP (ref 13–44)
MCHC RBC-ENTMCNC: 27.9 PG — SIGNIFICANT CHANGE UP (ref 27–34)
MCHC RBC-ENTMCNC: 31.3 % — LOW (ref 32–36)
MCV RBC AUTO: 88.9 FL — SIGNIFICANT CHANGE UP (ref 80–100)
METHOD TYPE: SIGNIFICANT CHANGE UP
MONOCYTES # BLD AUTO: 0.5 K/UL — SIGNIFICANT CHANGE UP (ref 0–0.9)
MONOCYTES NFR BLD AUTO: 8.3 % — SIGNIFICANT CHANGE UP (ref 2–14)
NEUTROPHILS # BLD AUTO: 4.39 K/UL — SIGNIFICANT CHANGE UP (ref 1.8–7.4)
NEUTROPHILS NFR BLD AUTO: 72.9 % — SIGNIFICANT CHANGE UP (ref 43–77)
ORGANISM # SPEC MICROSCOPIC CNT: SIGNIFICANT CHANGE UP
ORGANISM # SPEC MICROSCOPIC CNT: SIGNIFICANT CHANGE UP
PLATELET # BLD AUTO: 193 K/UL — SIGNIFICANT CHANGE UP (ref 150–400)
PMV BLD: 10.2 FL — SIGNIFICANT CHANGE UP (ref 7–13)
POTASSIUM SERPL-MCNC: 3.7 MMOL/L — SIGNIFICANT CHANGE UP (ref 3.5–5.3)
POTASSIUM SERPL-SCNC: 3.7 MMOL/L — SIGNIFICANT CHANGE UP (ref 3.5–5.3)
PROT SERPL-MCNC: 5.5 G/DL — LOW (ref 6–8.3)
RBC # BLD: 3.59 M/UL — LOW (ref 3.8–5.2)
RBC # FLD: 17.3 % — HIGH (ref 10.3–14.5)
SODIUM SERPL-SCNC: 138 MMOL/L — SIGNIFICANT CHANGE UP (ref 135–145)
WBC # BLD: 6.03 K/UL — SIGNIFICANT CHANGE UP (ref 3.8–10.5)
WBC # FLD AUTO: 6.03 K/UL — SIGNIFICANT CHANGE UP (ref 3.8–10.5)

## 2017-01-08 PROCEDURE — 99233 SBSQ HOSP IP/OBS HIGH 50: CPT

## 2017-01-08 RX ORDER — HYDROMORPHONE HYDROCHLORIDE 2 MG/ML
2 INJECTION INTRAMUSCULAR; INTRAVENOUS; SUBCUTANEOUS EVERY 4 HOURS
Qty: 0 | Refills: 0 | Status: DISCONTINUED | OUTPATIENT
Start: 2017-01-08 | End: 2017-01-08

## 2017-01-08 RX ORDER — HYDROMORPHONE HYDROCHLORIDE 2 MG/ML
4 INJECTION INTRAMUSCULAR; INTRAVENOUS; SUBCUTANEOUS EVERY 4 HOURS
Qty: 0 | Refills: 0 | Status: DISCONTINUED | OUTPATIENT
Start: 2017-01-08 | End: 2017-01-08

## 2017-01-08 RX ADMIN — Medication 90 MILLIGRAM(S): at 05:21

## 2017-01-08 RX ADMIN — HYDROMORPHONE HYDROCHLORIDE 2 MILLIGRAM(S): 2 INJECTION INTRAMUSCULAR; INTRAVENOUS; SUBCUTANEOUS at 05:05

## 2017-01-08 RX ADMIN — Medication 200 MILLIGRAM(S): at 13:20

## 2017-01-08 RX ADMIN — HYDROMORPHONE HYDROCHLORIDE 2 MILLIGRAM(S): 2 INJECTION INTRAMUSCULAR; INTRAVENOUS; SUBCUTANEOUS at 10:35

## 2017-01-08 RX ADMIN — Medication 15 MILLILITER(S): at 23:00

## 2017-01-08 RX ADMIN — Medication 1 MILLIGRAM(S): at 13:20

## 2017-01-08 RX ADMIN — ONDANSETRON 4 MILLIGRAM(S): 8 TABLET, FILM COATED ORAL at 13:16

## 2017-01-08 RX ADMIN — Medication 100 MILLIGRAM(S): at 14:19

## 2017-01-08 RX ADMIN — ONDANSETRON 4 MILLIGRAM(S): 8 TABLET, FILM COATED ORAL at 17:28

## 2017-01-08 RX ADMIN — HYDROMORPHONE HYDROCHLORIDE 2 MILLIGRAM(S): 2 INJECTION INTRAMUSCULAR; INTRAVENOUS; SUBCUTANEOUS at 01:42

## 2017-01-08 RX ADMIN — Medication 125 MILLIGRAM(S): at 13:21

## 2017-01-08 RX ADMIN — ONDANSETRON 4 MILLIGRAM(S): 8 TABLET, FILM COATED ORAL at 00:42

## 2017-01-08 RX ADMIN — HYDROMORPHONE HYDROCHLORIDE 4 MILLIGRAM(S): 2 INJECTION INTRAMUSCULAR; INTRAVENOUS; SUBCUTANEOUS at 20:46

## 2017-01-08 RX ADMIN — Medication 125 MILLIGRAM(S): at 05:21

## 2017-01-08 RX ADMIN — ONDANSETRON 4 MILLIGRAM(S): 8 TABLET, FILM COATED ORAL at 23:01

## 2017-01-08 RX ADMIN — Medication 8 MILLIGRAM(S): at 13:20

## 2017-01-08 RX ADMIN — HYDROMORPHONE HYDROCHLORIDE 4 MILLIGRAM(S): 2 INJECTION INTRAMUSCULAR; INTRAVENOUS; SUBCUTANEOUS at 21:46

## 2017-01-08 RX ADMIN — HYDROMORPHONE HYDROCHLORIDE 2 MILLIGRAM(S): 2 INJECTION INTRAMUSCULAR; INTRAVENOUS; SUBCUTANEOUS at 00:42

## 2017-01-08 RX ADMIN — Medication 100 MILLIGRAM(S): at 05:22

## 2017-01-08 RX ADMIN — HYDROMORPHONE HYDROCHLORIDE 2 MILLIGRAM(S): 2 INJECTION INTRAMUSCULAR; INTRAVENOUS; SUBCUTANEOUS at 14:18

## 2017-01-08 RX ADMIN — HYDROMORPHONE HYDROCHLORIDE 2 MILLIGRAM(S): 2 INJECTION INTRAMUSCULAR; INTRAVENOUS; SUBCUTANEOUS at 14:35

## 2017-01-08 RX ADMIN — Medication 125 MILLIGRAM(S): at 00:43

## 2017-01-08 RX ADMIN — Medication 100 MILLIGRAM(S): at 21:53

## 2017-01-08 RX ADMIN — ONDANSETRON 4 MILLIGRAM(S): 8 TABLET, FILM COATED ORAL at 05:21

## 2017-01-08 RX ADMIN — HYDROMORPHONE HYDROCHLORIDE 2 MILLIGRAM(S): 2 INJECTION INTRAMUSCULAR; INTRAVENOUS; SUBCUTANEOUS at 04:49

## 2017-01-08 RX ADMIN — PANTOPRAZOLE SODIUM 40 MILLIGRAM(S): 20 TABLET, DELAYED RELEASE ORAL at 05:22

## 2017-01-08 RX ADMIN — Medication 200 MILLIGRAM(S): at 17:29

## 2017-01-08 RX ADMIN — HYDROMORPHONE HYDROCHLORIDE 2 MILLIGRAM(S): 2 INJECTION INTRAMUSCULAR; INTRAVENOUS; SUBCUTANEOUS at 10:17

## 2017-01-08 RX ADMIN — Medication 125 MILLIGRAM(S): at 23:01

## 2017-01-08 RX ADMIN — Medication 200 MILLIGRAM(S): at 05:22

## 2017-01-08 RX ADMIN — Medication 125 MILLIGRAM(S): at 17:29

## 2017-01-09 ENCOUNTER — TRANSCRIPTION ENCOUNTER (OUTPATIENT)
Age: 27
End: 2017-01-09

## 2017-01-09 VITALS — DIASTOLIC BLOOD PRESSURE: 76 MMHG | HEART RATE: 90 BPM | SYSTOLIC BLOOD PRESSURE: 140 MMHG

## 2017-01-09 LAB
ALBUMIN SERPL ELPH-MCNC: 3.1 G/DL — LOW (ref 3.3–5)
ALP SERPL-CCNC: 53 U/L — SIGNIFICANT CHANGE UP (ref 40–120)
ALT FLD-CCNC: 8 U/L — SIGNIFICANT CHANGE UP (ref 4–33)
AST SERPL-CCNC: 12 U/L — SIGNIFICANT CHANGE UP (ref 4–32)
BASOPHILS # BLD AUTO: 0.03 K/UL — SIGNIFICANT CHANGE UP (ref 0–0.2)
BASOPHILS NFR BLD AUTO: 0.7 % — SIGNIFICANT CHANGE UP (ref 0–2)
BILIRUB SERPL-MCNC: 0.2 MG/DL — SIGNIFICANT CHANGE UP (ref 0.2–1.2)
BUN SERPL-MCNC: 36 MG/DL — HIGH (ref 7–23)
CALCIUM SERPL-MCNC: 8.8 MG/DL — SIGNIFICANT CHANGE UP (ref 8.4–10.5)
CHLORIDE SERPL-SCNC: 99 MMOL/L — SIGNIFICANT CHANGE UP (ref 98–107)
CO2 SERPL-SCNC: 24 MMOL/L — SIGNIFICANT CHANGE UP (ref 22–31)
CREAT SERPL-MCNC: 8.02 MG/DL — HIGH (ref 0.5–1.3)
EOSINOPHIL # BLD AUTO: 0.03 K/UL — SIGNIFICANT CHANGE UP (ref 0–0.5)
EOSINOPHIL NFR BLD AUTO: 0.7 % — SIGNIFICANT CHANGE UP (ref 0–6)
GLUCOSE SERPL-MCNC: 82 MG/DL — SIGNIFICANT CHANGE UP (ref 70–99)
HCT VFR BLD CALC: 30.1 % — LOW (ref 34.5–45)
HGB BLD-MCNC: 9.7 G/DL — LOW (ref 11.5–15.5)
IMM GRANULOCYTES NFR BLD AUTO: 1 % — SIGNIFICANT CHANGE UP (ref 0–1.5)
LYMPHOCYTES # BLD AUTO: 0.99 K/UL — LOW (ref 1–3.3)
LYMPHOCYTES # BLD AUTO: 23.6 % — SIGNIFICANT CHANGE UP (ref 13–44)
MCHC RBC-ENTMCNC: 28 PG — SIGNIFICANT CHANGE UP (ref 27–34)
MCHC RBC-ENTMCNC: 32.2 % — SIGNIFICANT CHANGE UP (ref 32–36)
MCV RBC AUTO: 87 FL — SIGNIFICANT CHANGE UP (ref 80–100)
MONOCYTES # BLD AUTO: 0.45 K/UL — SIGNIFICANT CHANGE UP (ref 0–0.9)
MONOCYTES NFR BLD AUTO: 10.7 % — SIGNIFICANT CHANGE UP (ref 2–14)
NEUTROPHILS # BLD AUTO: 2.65 K/UL — SIGNIFICANT CHANGE UP (ref 1.8–7.4)
NEUTROPHILS NFR BLD AUTO: 63.3 % — SIGNIFICANT CHANGE UP (ref 43–77)
PLATELET # BLD AUTO: 202 K/UL — SIGNIFICANT CHANGE UP (ref 150–400)
PMV BLD: 9.4 FL — SIGNIFICANT CHANGE UP (ref 7–13)
POTASSIUM SERPL-MCNC: 3.9 MMOL/L — SIGNIFICANT CHANGE UP (ref 3.5–5.3)
POTASSIUM SERPL-SCNC: 3.9 MMOL/L — SIGNIFICANT CHANGE UP (ref 3.5–5.3)
PROT SERPL-MCNC: 5.4 G/DL — LOW (ref 6–8.3)
RBC # BLD: 3.46 M/UL — LOW (ref 3.8–5.2)
RBC # FLD: 16.9 % — HIGH (ref 10.3–14.5)
SODIUM SERPL-SCNC: 137 MMOL/L — SIGNIFICANT CHANGE UP (ref 135–145)
WBC # BLD: 4.19 K/UL — SIGNIFICANT CHANGE UP (ref 3.8–10.5)
WBC # FLD AUTO: 4.19 K/UL — SIGNIFICANT CHANGE UP (ref 3.8–10.5)

## 2017-01-09 PROCEDURE — 99239 HOSP IP/OBS DSCHRG MGMT >30: CPT

## 2017-01-09 PROCEDURE — 99222 1ST HOSP IP/OBS MODERATE 55: CPT

## 2017-01-09 RX ORDER — VANCOMYCIN HCL 1 G
1 VIAL (EA) INTRAVENOUS
Qty: 28 | Refills: 0 | OUTPATIENT
Start: 2017-01-09 | End: 2017-01-16

## 2017-01-09 RX ORDER — VANCOMYCIN HCL 1 G
2.5 VIAL (EA) INTRAVENOUS
Qty: 0 | Refills: 0 | COMMUNITY
Start: 2017-01-09

## 2017-01-09 RX ORDER — VANCOMYCIN HCL 1 G
1 VIAL (EA) INTRAVENOUS
Qty: 60 | Refills: 0 | OUTPATIENT
Start: 2017-01-09 | End: 2017-02-08

## 2017-01-09 RX ORDER — PANTOPRAZOLE SODIUM 20 MG/1
1 TABLET, DELAYED RELEASE ORAL
Qty: 0 | Refills: 0 | COMMUNITY
Start: 2017-01-09

## 2017-01-09 RX ADMIN — PANTOPRAZOLE SODIUM 40 MILLIGRAM(S): 20 TABLET, DELAYED RELEASE ORAL at 05:59

## 2017-01-09 RX ADMIN — Medication 200 MILLIGRAM(S): at 17:15

## 2017-01-09 RX ADMIN — Medication 200 MILLIGRAM(S): at 13:46

## 2017-01-09 RX ADMIN — ONDANSETRON 4 MILLIGRAM(S): 8 TABLET, FILM COATED ORAL at 13:46

## 2017-01-09 RX ADMIN — Medication 1 MILLIGRAM(S): at 13:45

## 2017-01-09 RX ADMIN — Medication 125 MILLIGRAM(S): at 05:59

## 2017-01-09 RX ADMIN — Medication 200 MILLIGRAM(S): at 05:59

## 2017-01-09 RX ADMIN — Medication 90 MILLIGRAM(S): at 05:59

## 2017-01-09 RX ADMIN — ONDANSETRON 4 MILLIGRAM(S): 8 TABLET, FILM COATED ORAL at 05:59

## 2017-01-09 RX ADMIN — Medication 8 MILLIGRAM(S): at 13:45

## 2017-01-09 RX ADMIN — Medication 100 MILLIGRAM(S): at 13:45

## 2017-01-09 RX ADMIN — Medication 125 MILLIGRAM(S): at 17:15

## 2017-01-09 RX ADMIN — Medication 100 MILLIGRAM(S): at 05:59

## 2017-01-09 RX ADMIN — Medication 125 MILLIGRAM(S): at 13:46

## 2017-01-09 NOTE — DISCHARGE NOTE ADULT - MEDICATION SUMMARY - MEDICATIONS TO TAKE
I will START or STAY ON the medications listed below when I get home from the hospital:    methylPREDNISolone 4 mg oral tablet  -- 2 tab(s) by mouth once a day  -- Indication: For Systemic lupus erythematosus, unspecified SLE type, unspecified organ involvement status    hydroxychloroquine 200 mg oral tablet  -- 1 tab(s) by mouth once a day  -- Indication: For Systemic lupus erythematosus, unspecified SLE type, unspecified organ involvement status    labetalol 200 mg oral tablet  -- 1 tab(s) by mouth 2 times a day  -- Indication: For Hypertensive urgency    NIFEdipine 90 mg oral tablet, extended release  -- 1 tab(s) by mouth once a day  -- Indication: For Hypertensive urgency    vancomycin 125 mg oral capsule  -- 1 cap(s) by mouth every 6 hours  -- Indication: For Clostridium difficile colitis    vancomycin 125 mg oral capsule  -- 1 cap(s) by mouth 2 times a day X 7 DAYS  -- Indication: For Clostridium difficile colitis    vancomycin 125 mg oral capsule  -- 1 cap(s) by mouth once a day  -- Indication: For Clostridium difficile colitis    vancomycin 125 mg oral capsule  -- 1 cap(s) by mouth every other day X7 days  -- Indication: For Clostridium difficile colitis    vancomycin 125 mg oral capsule  -- 1 cap(s) by mouth every 3 days for 5 doses  -- Indication: For Clostridium difficile colitis    lactobacillus acidophilus oral capsule  -- 1 cap(s) by mouth 3 times a day (with meals)  -- Indication: For Clostridium difficile colitis    omeprazole 40 mg oral delayed release capsule  -- 1 cap(s) by mouth once a day  -- Indication: For Noninfectious gastroenteritis    hydrALAZINE 100 mg oral tablet  -- 1 tab(s) by mouth 2 times a day  -- Indication: For Hypertensive urgency    folic acid 1 mg oral tablet  -- 1 tab(s) by mouth once a day  -- Indication: For Supplement I will START or STAY ON the medications listed below when I get home from the hospital:    Vancomycin 125 mg  -- 1 cap(s) by mouth every 6 hours x 7 days then 1 cap oral every 12 hrs x 7 days then 1 cap oral daily x 7 days then 1 cap every other day x 7 days  -- Indication: For Clostridium difficile colitis    methylPREDNISolone 4 mg oral tablet  -- 2 tab(s) by mouth once a day  -- Indication: For Systemic lupus erythematosus, unspecified SLE type, unspecified organ involvement status    hydroxychloroquine 200 mg oral tablet  -- 1 tab(s) by mouth once a day  -- Indication: For Systemic lupus erythematosus, unspecified SLE type, unspecified organ involvement status    labetalol 200 mg oral tablet  -- 1 tab(s) by mouth 2 times a day  -- Indication: For Hypertensive urgency    NIFEdipine 90 mg oral tablet, extended release  -- 1 tab(s) by mouth once a day  -- Indication: For Hypertensive urgency    vancomycin 125 mg oral capsule  -- 1 cap(s) by mouth every 3 days for 5 doses  -- Indication: For Clostridium difficile colitis    lactobacillus acidophilus oral capsule  -- 1 cap(s) by mouth 3 times a day (with meals)  -- Indication: For Clostridium difficile colitis    omeprazole 40 mg oral delayed release capsule  -- 1 cap(s) by mouth once a day  -- Indication: For Noninfectious gastroenteritis    hydrALAZINE 100 mg oral tablet  -- 1 tab(s) by mouth 2 times a day  -- Indication: For Hypertensive urgency    folic acid 1 mg oral tablet  -- 1 tab(s) by mouth once a day  -- Indication: For Supplement I will START or STAY ON the medications listed below when I get home from the hospital:    Vancomycin 125 mg  -- 1 cap(s) by mouth every 6 hours x 7 days then 1 cap oral every 12 hrs x 7 days then 1 cap oral daily x 7 days then 1 cap every other day x 7 days  -- Indication: For Clostridium difficile colitis    methylPREDNISolone 4 mg oral tablet  -- 2 tab(s) by mouth once a day  -- Indication: For Systemic lupus erythematosus, unspecified SLE type, unspecified organ involvement status    hydroxychloroquine 200 mg oral tablet  -- 1 tab(s) by mouth once a day  -- Indication: For Systemic lupus erythematosus, unspecified SLE type, unspecified organ involvement status    labetalol 200 mg oral tablet  -- 1 tab(s) by mouth 2 times a day  -- Indication: For Hypertensive urgency    NIFEdipine 90 mg oral tablet, extended release  -- 1 tab(s) by mouth once a day  -- Indication: For Hypertensive urgency    vancomycin 125 mg oral capsule  -- 1 cap(s) by mouth every 6 hours Use as directed  -- Indication: For Clostridium difficile colitis    lactobacillus acidophilus oral capsule  -- 1 cap(s) by mouth 3 times a day (with meals)  -- Indication: For Clostridium difficile colitis    omeprazole 40 mg oral delayed release capsule  -- 1 cap(s) by mouth once a day  -- Indication: For Noninfectious gastroenteritis    hydrALAZINE 100 mg oral tablet  -- 1 tab(s) by mouth 2 times a day  -- Indication: For Hypertensive urgency    folic acid 1 mg oral tablet  -- 1 tab(s) by mouth once a day  -- Indication: For Supplement

## 2017-01-09 NOTE — DISCHARGE NOTE ADULT - PLAN OF CARE
Recurrent You were seen by Infection disease doctor in the hospital who recommended to continue with Vancomycin tapering dose for 5 weeks for recurrence of C-diff infection. Please complete the course of treatment as prescribed. Please follow up with Dr Nava Infection disease in 2 weeks for further management. Please continue taking medications as prescribed. Monitor blood pressure at home. Please follow up with your PCP within a week for further management. Please follow up with your PCP and Renal doctor for further management of dialyses. Your HD scheduled for tomorrow 01/10/17.

## 2017-01-09 NOTE — DISCHARGE NOTE ADULT - PATIENT PORTAL LINK FT
“You can access the FollowHealth Patient Portal, offered by [facility], by registering with the following website: [email or web address]”

## 2017-01-09 NOTE — DISCHARGE NOTE ADULT - CARE PROVIDERS DIRECT ADDRESSES
,[email or web address]

## 2017-01-09 NOTE — DISCHARGE NOTE ADULT - CARE PLAN
Principal Discharge DX:	Clostridium difficile colitis  Goal:	Recurrent  Instructions for follow-up, activity and diet:	You were seen by Infection disease doctor in the hospital who recommended to continue with Vancomycin tapering dose for 5 weeks for recurrence of C-diff infection. Please complete the course of treatment as prescribed. Please follow up with Dr Nava Infection disease in 2 weeks for further management.  Secondary Diagnosis:	Hypertensive urgency  Instructions for follow-up, activity and diet:	Please continue taking medications as prescribed. Monitor blood pressure at home. Please follow up with your PCP within a week for further management.  Secondary Diagnosis:	ESRD (end stage renal disease) on dialysis  Instructions for follow-up, activity and diet:	Please follow up with your PCP and Renal doctor for further management of dialyses. Your HD scheduled for tomorrow 01/10/17.

## 2017-01-09 NOTE — DISCHARGE NOTE ADULT - HOSPITAL COURSE
recurrent C-doff colitis, p/w abdominal pain, diarrhea, possible melena, C-diff toxin is positive started on PO vanco with resolution of diarrhea, now without loose BM x 24 hours ID consulted who recommended Vanco taper. patient received HD, and scheduled to resume HD as outpatient on usual schedule no overt bleeding inpatient. H/H with 2 gram drop, likely due to IVF hydration. outpatient GI f/u. recurrent C-diff colitis, p/w abdominal pain, diarrhea, possible melena, C-diff toxin is positive started on PO vanco with resolution of diarrhea, now without loose BM x 24 hours ID consulted who recommended Vanco taper. patient received HD, and scheduled to resume HD as outpatient on usual schedule no overt bleeding inpatient. H/H with 2 gram drop, likely due to IVF hydration. Outpatient GI & ID follow up.

## 2017-01-09 NOTE — DISCHARGE NOTE ADULT - CARE PROVIDER_API CALL
Natasha Nava), Infectious Disease; Internal Medicine  [address] [place] [address]  Phone: [phone]  Fax: [phone]    Naun Massey), Nephrology  [address] [place] [address]  Phone: [phone]  Fax: [phone]    Louisa Leonardo), Internal Medicine; Rheumatology  [address] [place] [id number]  Phone: [phone]  Fax: [phone]

## 2017-01-09 NOTE — DISCHARGE NOTE ADULT - ADDITIONAL INSTRUCTIONS
You were seen by Infection disease doctor in the hospital who recommended to continue with Vancomycin tapering dose for 5 weeks for recurrence of C-diff infection. Please complete the course of treatment as prescribed. Please follow up with Dr Nava Infection disease in 2 weeks for further management.

## 2017-01-17 RX ORDER — VANCOMYCIN HCL 1 G
1 VIAL (EA) INTRAVENOUS
Qty: 14 | Refills: 0 | OUTPATIENT
Start: 2017-01-17 | End: 2017-01-24

## 2017-01-25 RX ORDER — VANCOMYCIN HCL 1 G
1 VIAL (EA) INTRAVENOUS
Qty: 7 | Refills: 0 | OUTPATIENT
Start: 2017-01-25 | End: 2017-02-01

## 2017-02-02 RX ORDER — VANCOMYCIN HCL 1 G
1 VIAL (EA) INTRAVENOUS
Qty: 4 | Refills: 0 | OUTPATIENT
Start: 2017-02-02 | End: 2017-02-09

## 2017-02-09 RX ORDER — VANCOMYCIN HCL 1 G
1 VIAL (EA) INTRAVENOUS
Qty: 5 | Refills: 0 | OUTPATIENT
Start: 2017-02-09

## 2017-03-10 ENCOUNTER — APPOINTMENT (OUTPATIENT)
Dept: RHEUMATOLOGY | Facility: CLINIC | Age: 27
End: 2017-03-10

## 2017-03-10 ENCOUNTER — LABORATORY RESULT (OUTPATIENT)
Age: 27
End: 2017-03-10

## 2017-03-10 VITALS
HEART RATE: 96 BPM | TEMPERATURE: 97.6 F | OXYGEN SATURATION: 98 % | WEIGHT: 127 LBS | BODY MASS INDEX: 20.41 KG/M2 | HEIGHT: 66 IN | SYSTOLIC BLOOD PRESSURE: 136 MMHG | DIASTOLIC BLOOD PRESSURE: 84 MMHG

## 2017-03-13 LAB
ADJUSTED MITOGEN: 3.57 IU/ML
ADJUSTED TB AG: 0 IU/ML
ALBUMIN SERPL ELPH-MCNC: 4.4 G/DL
ALP BLD-CCNC: 82 U/L
ALT SERPL-CCNC: 7 U/L
ANION GAP SERPL CALC-SCNC: 16 MMOL/L
APPEARANCE: ABNORMAL
AST SERPL-CCNC: 15 U/L
BACTERIA: ABNORMAL
BASOPHILS # BLD AUTO: 0.03 K/UL
BASOPHILS NFR BLD AUTO: 0.7 %
BILIRUB SERPL-MCNC: 0.3 MG/DL
BILIRUBIN URINE: NEGATIVE
BLOOD URINE: NEGATIVE
BUN SERPL-MCNC: 15 MG/DL
C3 SERPL-MCNC: 80 MG/DL
C4 SERPL-MCNC: 28 MG/DL
CALCIUM SERPL-MCNC: 9.9 MG/DL
CHLORIDE SERPL-SCNC: 96 MMOL/L
CO2 SERPL-SCNC: 28 MMOL/L
COLOR: YELLOW
CREAT SERPL-MCNC: 4.78 MG/DL
CREAT SPEC-SCNC: 89 MG/DL
CREAT/PROT UR: 4.3 RATIO
EOSINOPHIL # BLD AUTO: 0.06 K/UL
EOSINOPHIL NFR BLD AUTO: 1.3 %
GLUCOSE QUALITATIVE U: NORMAL MG/DL
GLUCOSE SERPL-MCNC: 80 MG/DL
HCT VFR BLD CALC: 40.2 %
HGB BLD-MCNC: 12.3 G/DL
HYALINE CASTS: 3 /LPF
IMM GRANULOCYTES NFR BLD AUTO: 1.1 %
KETONES URINE: NEGATIVE
LEUKOCYTE ESTERASE URINE: NEGATIVE
LYMPHOCYTES # BLD AUTO: 1.35 K/UL
LYMPHOCYTES NFR BLD AUTO: 29.9 %
M TB IFN-G BLD-IMP: NEGATIVE
MAN DIFF?: NORMAL
MCHC RBC-ENTMCNC: 28.9 PG
MCHC RBC-ENTMCNC: 30.6 GM/DL
MCV RBC AUTO: 94.4 FL
MICROSCOPIC-UA: NORMAL
MONOCYTES # BLD AUTO: 0.56 K/UL
MONOCYTES NFR BLD AUTO: 12.4 %
NEUTROPHILS # BLD AUTO: 2.46 K/UL
NEUTROPHILS NFR BLD AUTO: 54.6 %
NITRITE URINE: NEGATIVE
PH URINE: 8.5
PLATELET # BLD AUTO: 207 K/UL
POTASSIUM SERPL-SCNC: 3.5 MMOL/L
PROT SERPL-MCNC: 7.3 G/DL
PROT UR-MCNC: 380 MG/DL
PROTEIN URINE: 300 MG/DL
QUANTIFERON GOLD NIL: 0.06 IU/ML
RBC # BLD: 4.26 M/UL
RBC # FLD: 16.6 %
RED BLOOD CELLS URINE: 2 /HPF
SODIUM SERPL-SCNC: 140 MMOL/L
SPECIFIC GRAVITY URINE: 1.01
SQUAMOUS EPITHELIAL CELLS: >27 /HPF
UROBILINOGEN URINE: NORMAL MG/DL
WBC # FLD AUTO: 4.51 K/UL
WHITE BLOOD CELLS URINE: 18 /HPF

## 2017-03-15 LAB — SSDNA AB SER-ACNC: 73 U/ML

## 2017-03-21 ENCOUNTER — RX RENEWAL (OUTPATIENT)
Age: 27
End: 2017-03-21

## 2017-03-21 DIAGNOSIS — N25.81 SECONDARY HYPERPARATHYROIDISM OF RENAL ORIGIN: ICD-10-CM

## 2017-05-18 ENCOUNTER — RX RENEWAL (OUTPATIENT)
Age: 27
End: 2017-05-18

## 2017-06-07 ENCOUNTER — APPOINTMENT (OUTPATIENT)
Dept: RHEUMATOLOGY | Facility: CLINIC | Age: 27
End: 2017-06-07

## 2017-06-16 ENCOUNTER — RX RENEWAL (OUTPATIENT)
Age: 27
End: 2017-06-16

## 2017-06-16 ENCOUNTER — INPATIENT (INPATIENT)
Facility: HOSPITAL | Age: 27
LOS: 1 days | Discharge: HOME | End: 2017-06-18
Attending: INTERNAL MEDICINE | Admitting: INTERNAL MEDICINE
Payer: MEDICAID

## 2017-06-16 VITALS
RESPIRATION RATE: 15 BRPM | HEART RATE: 104 BPM | TEMPERATURE: 98 F | DIASTOLIC BLOOD PRESSURE: 78 MMHG | OXYGEN SATURATION: 100 % | SYSTOLIC BLOOD PRESSURE: 126 MMHG

## 2017-06-16 DIAGNOSIS — K21.9 GASTRO-ESOPHAGEAL REFLUX DISEASE WITHOUT ESOPHAGITIS: ICD-10-CM

## 2017-06-16 DIAGNOSIS — N18.6 END STAGE RENAL DISEASE: ICD-10-CM

## 2017-06-16 DIAGNOSIS — I10 ESSENTIAL (PRIMARY) HYPERTENSION: ICD-10-CM

## 2017-06-16 DIAGNOSIS — N18.9 CHRONIC KIDNEY DISEASE, UNSPECIFIED: ICD-10-CM

## 2017-06-16 DIAGNOSIS — N25.0 RENAL OSTEODYSTROPHY: ICD-10-CM

## 2017-06-16 DIAGNOSIS — R11.0 NAUSEA: ICD-10-CM

## 2017-06-16 DIAGNOSIS — I77.0 ARTERIOVENOUS FISTULA, ACQUIRED: Chronic | ICD-10-CM

## 2017-06-16 DIAGNOSIS — M32.9 SYSTEMIC LUPUS ERYTHEMATOSUS, UNSPECIFIED: ICD-10-CM

## 2017-06-16 DIAGNOSIS — Z29.9 ENCOUNTER FOR PROPHYLACTIC MEASURES, UNSPECIFIED: ICD-10-CM

## 2017-06-16 LAB
ALBUMIN SERPL ELPH-MCNC: 4.2 G/DL — SIGNIFICANT CHANGE UP (ref 3.3–5)
ALP SERPL-CCNC: 101 U/L — SIGNIFICANT CHANGE UP (ref 40–120)
ALT FLD-CCNC: 15 U/L — SIGNIFICANT CHANGE UP (ref 4–33)
AST SERPL-CCNC: 21 U/L — SIGNIFICANT CHANGE UP (ref 4–32)
BASE EXCESS BLDV CALC-SCNC: -4.7 MMOL/L — SIGNIFICANT CHANGE UP
BASOPHILS # BLD AUTO: 0.01 K/UL — SIGNIFICANT CHANGE UP (ref 0–0.2)
BASOPHILS NFR BLD AUTO: 0.2 % — SIGNIFICANT CHANGE UP (ref 0–2)
BILIRUB SERPL-MCNC: 0.3 MG/DL — SIGNIFICANT CHANGE UP (ref 0.2–1.2)
BLOOD GAS VENOUS - CREATININE: 13.6 MG/DL — HIGH (ref 0.5–1.3)
BUN SERPL-MCNC: 68 MG/DL — HIGH (ref 7–23)
CALCIUM SERPL-MCNC: 8.9 MG/DL — SIGNIFICANT CHANGE UP (ref 8.4–10.5)
CHLORIDE BLDV-SCNC: 101 MMOL/L — SIGNIFICANT CHANGE UP (ref 96–108)
CHLORIDE SERPL-SCNC: 95 MMOL/L — LOW (ref 98–107)
CO2 SERPL-SCNC: 17 MMOL/L — LOW (ref 22–31)
CREAT SERPL-MCNC: 12.46 MG/DL — HIGH (ref 0.5–1.3)
EOSINOPHIL # BLD AUTO: 0.13 K/UL — SIGNIFICANT CHANGE UP (ref 0–0.5)
EOSINOPHIL NFR BLD AUTO: 2.7 % — SIGNIFICANT CHANGE UP (ref 0–6)
GAS PNL BLDV: 135 MMOL/L — LOW (ref 136–146)
GLUCOSE BLDV-MCNC: 63 — LOW (ref 70–99)
GLUCOSE SERPL-MCNC: 66 MG/DL — LOW (ref 70–99)
HCO3 BLDV-SCNC: 20 MMOL/L — SIGNIFICANT CHANGE UP (ref 20–27)
HCT VFR BLD CALC: 25.2 % — LOW (ref 34.5–45)
HCT VFR BLDV CALC: 26.2 % — LOW (ref 34.5–45)
HGB BLD-MCNC: 8.3 G/DL — LOW (ref 11.5–15.5)
HGB BLDV-MCNC: 8.4 G/DL — LOW (ref 11.5–15.5)
IMM GRANULOCYTES NFR BLD AUTO: 0.8 % — SIGNIFICANT CHANGE UP (ref 0–1.5)
LACTATE BLDV-MCNC: 1.1 MMOL/L — SIGNIFICANT CHANGE UP (ref 0.5–2)
LYMPHOCYTES # BLD AUTO: 0.76 K/UL — LOW (ref 1–3.3)
LYMPHOCYTES # BLD AUTO: 15.5 % — SIGNIFICANT CHANGE UP (ref 13–44)
MCHC RBC-ENTMCNC: 27.9 PG — SIGNIFICANT CHANGE UP (ref 27–34)
MCHC RBC-ENTMCNC: 32.9 % — SIGNIFICANT CHANGE UP (ref 32–36)
MCV RBC AUTO: 84.8 FL — SIGNIFICANT CHANGE UP (ref 80–100)
MONOCYTES # BLD AUTO: 0.45 K/UL — SIGNIFICANT CHANGE UP (ref 0–0.9)
MONOCYTES NFR BLD AUTO: 9.2 % — SIGNIFICANT CHANGE UP (ref 2–14)
NEUTROPHILS # BLD AUTO: 3.51 K/UL — SIGNIFICANT CHANGE UP (ref 1.8–7.4)
NEUTROPHILS NFR BLD AUTO: 71.6 % — SIGNIFICANT CHANGE UP (ref 43–77)
NT-PROBNP SERPL-SCNC: SIGNIFICANT CHANGE UP PG/ML
PCO2 BLDV: 49 MMHG — SIGNIFICANT CHANGE UP (ref 41–51)
PH BLDV: 7.26 PH — LOW (ref 7.32–7.43)
PLATELET # BLD AUTO: 204 K/UL — SIGNIFICANT CHANGE UP (ref 150–400)
PMV BLD: 8.5 FL — SIGNIFICANT CHANGE UP (ref 7–13)
PO2 BLDV: 33 MMHG — LOW (ref 35–40)
POTASSIUM BLDV-SCNC: 4.2 MMOL/L — SIGNIFICANT CHANGE UP (ref 3.4–4.5)
POTASSIUM SERPL-MCNC: 4.6 MMOL/L — SIGNIFICANT CHANGE UP (ref 3.5–5.3)
POTASSIUM SERPL-SCNC: 4.6 MMOL/L — SIGNIFICANT CHANGE UP (ref 3.5–5.3)
PROT SERPL-MCNC: 7.7 G/DL — SIGNIFICANT CHANGE UP (ref 6–8.3)
RBC # BLD: 2.97 M/UL — LOW (ref 3.8–5.2)
RBC # FLD: 17.8 % — HIGH (ref 10.3–14.5)
SAO2 % BLDV: 48.5 % — LOW (ref 60–85)
SODIUM SERPL-SCNC: 135 MMOL/L — SIGNIFICANT CHANGE UP (ref 135–145)
WBC # BLD: 4.9 K/UL — SIGNIFICANT CHANGE UP (ref 3.8–10.5)
WBC # FLD AUTO: 4.9 K/UL — SIGNIFICANT CHANGE UP (ref 3.8–10.5)

## 2017-06-16 PROCEDURE — 71020: CPT | Mod: 26

## 2017-06-16 PROCEDURE — 99223 1ST HOSP IP/OBS HIGH 75: CPT

## 2017-06-16 RX ORDER — LABETALOL HCL 100 MG
1 TABLET ORAL
Qty: 90 | Refills: 0 | COMMUNITY

## 2017-06-16 RX ORDER — CINACALCET 30 MG/1
30 TABLET, FILM COATED ORAL DAILY
Qty: 0 | Refills: 0 | Status: DISCONTINUED | OUTPATIENT
Start: 2017-06-16 | End: 2017-06-18

## 2017-06-16 RX ORDER — HYDROXYCHLOROQUINE SULFATE 200 MG
200 TABLET ORAL DAILY
Qty: 0 | Refills: 0 | Status: DISCONTINUED | OUTPATIENT
Start: 2017-06-16 | End: 2017-06-18

## 2017-06-16 RX ORDER — HYDRALAZINE HCL 50 MG
100 TABLET ORAL EVERY 12 HOURS
Qty: 0 | Refills: 0 | Status: DISCONTINUED | OUTPATIENT
Start: 2017-06-16 | End: 2017-06-18

## 2017-06-16 RX ORDER — LABETALOL HCL 100 MG
300 TABLET ORAL
Qty: 0 | Refills: 0 | Status: DISCONTINUED | OUTPATIENT
Start: 2017-06-16 | End: 2017-06-18

## 2017-06-16 RX ORDER — NIFEDIPINE 30 MG
90 TABLET, EXTENDED RELEASE 24 HR ORAL DAILY
Qty: 0 | Refills: 0 | Status: DISCONTINUED | OUTPATIENT
Start: 2017-06-16 | End: 2017-06-18

## 2017-06-16 RX ORDER — GABAPENTIN 400 MG/1
100 CAPSULE ORAL THREE TIMES A DAY
Qty: 0 | Refills: 0 | Status: DISCONTINUED | OUTPATIENT
Start: 2017-06-16 | End: 2017-06-18

## 2017-06-16 RX ORDER — ACETAMINOPHEN 500 MG
650 TABLET ORAL ONCE
Qty: 0 | Refills: 0 | Status: COMPLETED | OUTPATIENT
Start: 2017-06-16 | End: 2017-06-16

## 2017-06-16 RX ORDER — FOLIC ACID 0.8 MG
1 TABLET ORAL DAILY
Qty: 0 | Refills: 0 | Status: DISCONTINUED | OUTPATIENT
Start: 2017-06-16 | End: 2017-06-18

## 2017-06-16 RX ORDER — CINACALCET 30 MG/1
1 TABLET, FILM COATED ORAL
Qty: 0 | Refills: 0 | COMMUNITY

## 2017-06-16 RX ORDER — PANTOPRAZOLE SODIUM 20 MG/1
40 TABLET, DELAYED RELEASE ORAL
Qty: 0 | Refills: 0 | Status: DISCONTINUED | OUTPATIENT
Start: 2017-06-16 | End: 2017-06-18

## 2017-06-16 RX ORDER — ERYTHROPOIETIN 10000 [IU]/ML
10000 INJECTION, SOLUTION INTRAVENOUS; SUBCUTANEOUS ONCE
Qty: 0 | Refills: 0 | Status: COMPLETED | OUTPATIENT
Start: 2017-06-16 | End: 2017-06-16

## 2017-06-16 RX ADMIN — Medication 650 MILLIGRAM(S): at 16:50

## 2017-06-16 RX ADMIN — ERYTHROPOIETIN 10000 UNIT(S): 10000 INJECTION, SOLUTION INTRAVENOUS; SUBCUTANEOUS at 21:28

## 2017-06-16 RX ADMIN — Medication 650 MILLIGRAM(S): at 15:58

## 2017-06-16 NOTE — H&P ADULT - HISTORY OF PRESENT ILLNESS
This is a 27 y/o F w/ a hx of ESRD on HD due to lupus nephritis TTS, HTN, renal osteodystrophy, anemia of chronic nephropathy, GERD, and SLE, who presents due to missing three treatments of outpatient dialysis.  Patient reports that she currently feels well, denies any dyspnea at rest, no orthopnea.  She reports missing dialysis on last Saturday due to worsening back/muscle ache, which is characteristic of her lupus flare. Then on Tuesday and Thursday patient's transportation did not arrive in time to bring her to dialysis. Due to [facility] policy, she was instructed to go to the hospital for evaluation and dialysis inpatient. At present time, patient reports mild lupus symptoms including back pain, joint pain, rash, and occasional SOB, which she attributes to change in weather. She denies any facial rash, photosensitivity, chest pain, palpitations, muscle weakness, dizziness, or syncope. In the ED, patient afebrile and HDS. Admitted to F for rule out lupus flare and for HD.

## 2017-06-16 NOTE — ED PROVIDER NOTE - ATTENDING CONTRIBUTION TO CARE
MICAELA Attending Note - Dr. Azar 26 y.o female PMH Lupus, ESRD on HD (Tu,Th,Sa) presents for dialysis after missing 3 dialysis sessions because of lupus flare.    PE: pt is alert and oriented, perrl, ent normal, membranes are moist, neck supple. no lymphadenopathy or thyroid enlargement, No JVD.  Chest clear to P&A, Heart- reg rhythm without murmur, rubs or gallops, radial pulses equal bilaterally.  Abd is soft, non-tender, Bowel sounds are active. no mass or organomegaly. : No CVA tenderness. Neuro:  Pt alert and oriented x 3. Perrl    Distal neurosensory is intact. Motor function is 5/5 strength bilaterally.  No focal deficits. Extremities:  No edema.  Skin: warm and dry.  Labs, admission for dialysis.

## 2017-06-16 NOTE — CONSULT NOTE ADULT - SUBJECTIVE AND OBJECTIVE BOX
[facility] DIVISION OF KIDNEY DISEASES AND HYPERTENSION -- INITIAL CONSULT NOTE  --------------------------------------------------------------------------------  HPI:  Patient is a 25 y/o F w/ ESRD on HD due to lupus nephritis TTS who presents due to missing three treatments of outpatient dialysis.  Patient reports that she currently feels well, denies any dyspnea at rest, no orthopnea.  She reports missing dialysis on last Saturday due to lupus flare.  She is generally unable to sit in chair for 3 hours while back and lower extremity joints are in pain.  Then on Tuesday and Thursday patient's transportation did not arrive in time to bring her to dialysis.  Due to [facility] policy, she was instructed to go to the hospital for evaluation and dialysis inpatient.        PAST HISTORY  --------------------------------------------------------------------------------  PAST MEDICAL & SURGICAL HISTORY:  ESRD (end stage renal disease) on dialysis: 2/2 microangiopathy  Microangiopathy  Lupus nephritis  Essential Hypertension, Benign  Systemic Lupus Erythematosus  A-V fistula  Hemorrhage following kidney biopsy    FAMILY HISTORY:  Family history of hypertension    PAST SOCIAL HISTORY:  denies any toxic habits    ALLERGIES & MEDICATIONS  --------------------------------------------------------------------------------  Allergies    No Known Allergies    Intolerances      Standing Inpatient Medications    PRN Inpatient Medications      REVIEW OF SYSTEMS  --------------------------------------------------------------------------------  Gen: No weight changes, fatigue, fevers/chills  Skin: No rashes  Head/Eyes/Ears/Mouth: No headache  Respiratory: No dyspnea, cough, wheezing, hemoptysis  CV: No chest pain, PND, orthopnea  GI: No abdominal pain, diarrhea, constipation, nausea, vomiting  : No increased frequency, dysuria, hematuria, nocturia  MSK: + back pain, + knee pain bilaterally  Neuro: No dizziness/lightheadedness  Heme: No easy bruising or bleeding  Endo: No heat/cold intolerance    VITALS/PHYSICAL EXAM  --------------------------------------------------------------------------------  T(C): 36.6, Max: 36.7 (06-16 @ 12:37)  HR: 85 (85 - 104)  BP: 138/92 (126/78 - 140/94)  RR: 16 (15 - 16)  SpO2: 100% (100% - 100%)  Wt(kg): --        Physical Exam:  	Gen: NAD, well-appearing  	HEENT: MMM  	Pulm: +bibasilar rales  	CV: RRR, S1S2; no rub  	Back: No spinal or CVA tenderness  	Abd: +BS, soft, nontender/nondistended  	: No suprapubic tenderness  	UE: no edema  	LE: no pitting edema  	Neuro: No focal deficits  	Psych: Normal affect and mood  	Skin: Warm, without rashes  	Vascular access:  RUE AVF + thrill and bruit    LABS/STUDIES  --------------------------------------------------------------------------------              8.3    4.90  >-----------<  204      [06-16-17 @ 14:20]              25.2     135  |  95  |  68  ----------------------------<  66      [06-16-17 @ 14:20]  4.6   |  17  |  12.46        Ca     8.9     [06-16-17 @ 14:20]    TPro  7.7  /  Alb  4.2  /  TBili  0.3  /  DBili  x   /  AST  21  /  ALT  15  /  AlkPhos  101  [06-16-17 @ 14:20]

## 2017-06-16 NOTE — H&P ADULT - PROBLEM SELECTOR PLAN 1
Missed 3 sessions of HD. Follows with Dr. Naun Massey. House nephro aware of patient  - Plan for HD tonight and resume regular schedule tomorrow (T/Th/Sat)  - HD orders per renal team (appreciated)

## 2017-06-16 NOTE — ED ADULT NURSE NOTE - OBJECTIVE STATEMENT
received pt A&Ox3 in no apparent distress at this time. #20g IVL to L AC, bloods drawn and sent to the lab. no s/s of infiltration noted at this time. vss. cardiac monitor in place. pending MD cerda

## 2017-06-16 NOTE — H&P ADULT - PROBLEM SELECTOR PLAN 2
Likely mild flare up, if anything, possibly precipitated by change in weather  - Check C3, DS-DNA, ESR/CRP  - Would consult Rheumatology (follows with Dr. Leonardo) in AM  - C/w prednisone 5mg PO qdaily and Plaquenil for now   - Start percocet prn for moderate-severe pain   - C/w gabapentin

## 2017-06-16 NOTE — H&P ADULT - NSHPLABSRESULTS_GEN_ALL_CORE
LABS: REVIEWED                8.3    4.90  )-----------( 204      ( 16 Jun 2017 14:20 )             25.2     06-16    135  |  95<L>  |  68<H>  ----------------------------<  66<L>  4.6   |  17<L>  |  12.46<H>    Ca    8.9      16 Jun 2017 14:20    TPro  7.7  /  Alb  4.2  /  TBili  0.3  /  DBili  x   /  AST  21  /  ALT  15  /  AlkPhos  101  06-16    IMAGING: PERSONALLY REVIEWED    CXR: Clear Lungs     CHART REVIEWED: Nephro Consult    DISCUSSED CASE WITH: ADS NP- Discussed need to call rheumatology in AM for consult

## 2017-06-16 NOTE — CONSULT NOTE ADULT - ASSESSMENT
Patient is a 25 y/o F w/ ESRD on HD TTS due to SLE who presents with 3 missed dialysis treatments for inpatient HD.

## 2017-06-16 NOTE — CONSULT NOTE ADULT - ATTENDING COMMENTS
Pt. with ESRD on HD at [facility]. Pt. missed 3 HD sessions. Pt. with history of noncompliance to HD sessions. Pt. received HD last evening. Pt. seen during HD today. Pt. tolerating HD well. /88 at time of HD rounds. BP elevated during HD. Continue with current UF goal with HD. RUE AVF functioning well. Monitor BP and labs.

## 2017-06-16 NOTE — ED PROVIDER NOTE - OBJECTIVE STATEMENT
26 y.o female PMH Lupus, ESRD on HD (Tu,Th,Sa) presents for dialysis after missing 3 dialysis sessions.  Her last HD session completed was Thursday, June 8.  On June 9 she started having a Lupus flare causing her back and joint pain and she missed her subsequent HD sessions.  Her lupus symptoms are better now and is ready to go to Dialysis, but her dialysis said she had to go to the ED because she missed more than one session consecutive session she had to go to the hospital.  She denies shortness of breath, and no chest pain, some mild peripheral edema.  She makes urine.  No fevers.        Nephrologist Dr. Naun Massey.

## 2017-06-16 NOTE — H&P ADULT - NSHPPHYSICALEXAM_GEN_ALL_CORE
GENERAL: NAD, well-developed  HEAD:  Atraumatic, Normocephalic  EYES: EOMI, PERRLA, conjunctiva and sclera clear  NECK: Supple, No JVD  CHEST/LUNG: Clear to auscultation bilaterally; No wheeze  HEART: Regular rate and rhythm; No murmurs, rubs, or gallops  ABDOMEN: Soft, Nontender, Nondistended; Bowel sounds present  EXTREMITIES:  2+ Peripheral Pulses, No clubbing, cyanosis, or edema, R. AVF with thrill and bruit   PSYCH: AAOx3  NEUROLOGY: non-focal  SKIN: Mild diffuse macular rash (very faint)

## 2017-06-16 NOTE — H&P ADULT - ASSESSMENT
25 y/o F w/ a hx of ESRD on HD due to lupus nephritis TTS, HTN, renal osteodystrophy, anemia of chronic nephropathy, GERD, and SLE, who presents due to missing three treatments of outpatient dialysis and rule out lupus flare.

## 2017-06-16 NOTE — CONSULT NOTE ADULT - PROBLEM SELECTOR RECOMMENDATION 9
Plan for HD today  -exeltra 170, 3 hours, 400 ABF, 500 dialysate, 2K+, 2.5 calcium, 1.8 kg UF  -will plan for HD again tomorrow so that patient can resume home scheduling of TTS

## 2017-06-16 NOTE — CONSULT NOTE ADULT - PROBLEM SELECTOR RECOMMENDATION 3
-normotensive  -monitor BP after ultrafiltration -BP in acceptable range  -monitor BP after ultrafiltration

## 2017-06-17 ENCOUNTER — TRANSCRIPTION ENCOUNTER (OUTPATIENT)
Age: 27
End: 2017-06-17

## 2017-06-17 LAB
ALBUMIN SERPL ELPH-MCNC: 3.9 G/DL — SIGNIFICANT CHANGE UP (ref 3.3–5)
ALP SERPL-CCNC: 89 U/L — SIGNIFICANT CHANGE UP (ref 40–120)
ALT FLD-CCNC: 19 U/L — SIGNIFICANT CHANGE UP (ref 4–33)
ANISOCYTOSIS BLD QL: SLIGHT — SIGNIFICANT CHANGE UP
AST SERPL-CCNC: 25 U/L — SIGNIFICANT CHANGE UP (ref 4–32)
BASOPHILS # BLD AUTO: 0.01 K/UL — SIGNIFICANT CHANGE UP (ref 0–0.2)
BASOPHILS NFR BLD AUTO: 0.4 % — SIGNIFICANT CHANGE UP (ref 0–2)
BILIRUB SERPL-MCNC: 0.4 MG/DL — SIGNIFICANT CHANGE UP (ref 0.2–1.2)
BUN SERPL-MCNC: 13 MG/DL — SIGNIFICANT CHANGE UP (ref 7–23)
BUN SERPL-MCNC: 24 MG/DL — HIGH (ref 7–23)
C3 SERPL-MCNC: 67.1 MG/DL — LOW (ref 90–180)
CALCIUM SERPL-MCNC: 8.8 MG/DL — SIGNIFICANT CHANGE UP (ref 8.4–10.5)
CALCIUM SERPL-MCNC: 8.9 MG/DL — SIGNIFICANT CHANGE UP (ref 8.4–10.5)
CHLORIDE SERPL-SCNC: 96 MMOL/L — LOW (ref 98–107)
CHLORIDE SERPL-SCNC: 98 MMOL/L — SIGNIFICANT CHANGE UP (ref 98–107)
CO2 SERPL-SCNC: 26 MMOL/L — SIGNIFICANT CHANGE UP (ref 22–31)
CO2 SERPL-SCNC: 27 MMOL/L — SIGNIFICANT CHANGE UP (ref 22–31)
CREAT SERPL-MCNC: 3.77 MG/DL — HIGH (ref 0.5–1.3)
CREAT SERPL-MCNC: 6.04 MG/DL — HIGH (ref 0.5–1.3)
CRP SERPL-MCNC: 10.3 MG/L — HIGH (ref 0.3–5)
EOSINOPHIL # BLD AUTO: 0.08 K/UL — SIGNIFICANT CHANGE UP (ref 0–0.5)
EOSINOPHIL NFR BLD AUTO: 3 % — SIGNIFICANT CHANGE UP (ref 0–6)
ERYTHROCYTE [SEDIMENTATION RATE] IN BLOOD: 74 MM/HR — HIGH (ref 4–25)
GLUCOSE SERPL-MCNC: 81 MG/DL — SIGNIFICANT CHANGE UP (ref 70–99)
GLUCOSE SERPL-MCNC: 83 MG/DL — SIGNIFICANT CHANGE UP (ref 70–99)
HCT VFR BLD CALC: 22.4 % — LOW (ref 34.5–45)
HCT VFR BLD CALC: 25.4 % — LOW (ref 34.5–45)
HGB BLD-MCNC: 7.3 G/DL — LOW (ref 11.5–15.5)
HGB BLD-MCNC: 8.1 G/DL — LOW (ref 11.5–15.5)
HYPOCHROMIA BLD QL: SLIGHT — SIGNIFICANT CHANGE UP
IMM GRANULOCYTES NFR BLD AUTO: 0.7 % — SIGNIFICANT CHANGE UP (ref 0–1.5)
LYMPHOCYTES # BLD AUTO: 0.63 K/UL — LOW (ref 1–3.3)
LYMPHOCYTES # BLD AUTO: 23.2 % — SIGNIFICANT CHANGE UP (ref 13–44)
MANUAL SMEAR VERIFICATION: SIGNIFICANT CHANGE UP
MCHC RBC-ENTMCNC: 27.5 PG — SIGNIFICANT CHANGE UP (ref 27–34)
MCHC RBC-ENTMCNC: 27.7 PG — SIGNIFICANT CHANGE UP (ref 27–34)
MCHC RBC-ENTMCNC: 31.9 % — LOW (ref 32–36)
MCHC RBC-ENTMCNC: 32.6 % — SIGNIFICANT CHANGE UP (ref 32–36)
MCV RBC AUTO: 84.8 FL — SIGNIFICANT CHANGE UP (ref 80–100)
MCV RBC AUTO: 86.1 FL — SIGNIFICANT CHANGE UP (ref 80–100)
MONOCYTES # BLD AUTO: 0.47 K/UL — SIGNIFICANT CHANGE UP (ref 0–0.9)
MONOCYTES NFR BLD AUTO: 17.3 % — HIGH (ref 2–14)
NEUTROPHILS # BLD AUTO: 1.5 K/UL — LOW (ref 1.8–7.4)
NEUTROPHILS NFR BLD AUTO: 55.4 % — SIGNIFICANT CHANGE UP (ref 43–77)
PLATELET # BLD AUTO: 147 K/UL — LOW (ref 150–400)
PLATELET # BLD AUTO: 160 K/UL — SIGNIFICANT CHANGE UP (ref 150–400)
PLATELET COUNT - ESTIMATE: NORMAL — SIGNIFICANT CHANGE UP
PMV BLD: 8.6 FL — SIGNIFICANT CHANGE UP (ref 7–13)
PMV BLD: 9.5 FL — SIGNIFICANT CHANGE UP (ref 7–13)
POTASSIUM SERPL-MCNC: 3.6 MMOL/L — SIGNIFICANT CHANGE UP (ref 3.5–5.3)
POTASSIUM SERPL-MCNC: 3.7 MMOL/L — SIGNIFICANT CHANGE UP (ref 3.5–5.3)
POTASSIUM SERPL-SCNC: 3.6 MMOL/L — SIGNIFICANT CHANGE UP (ref 3.5–5.3)
POTASSIUM SERPL-SCNC: 3.7 MMOL/L — SIGNIFICANT CHANGE UP (ref 3.5–5.3)
PROT SERPL-MCNC: 7 G/DL — SIGNIFICANT CHANGE UP (ref 6–8.3)
RBC # BLD: 2.64 M/UL — LOW (ref 3.8–5.2)
RBC # BLD: 2.95 M/UL — LOW (ref 3.8–5.2)
RBC # FLD: 17.8 % — HIGH (ref 10.3–14.5)
RBC # FLD: 17.8 % — HIGH (ref 10.3–14.5)
SCHISTOCYTES BLD QL AUTO: SLIGHT — SIGNIFICANT CHANGE UP
SODIUM SERPL-SCNC: 140 MMOL/L — SIGNIFICANT CHANGE UP (ref 135–145)
SODIUM SERPL-SCNC: 140 MMOL/L — SIGNIFICANT CHANGE UP (ref 135–145)
WBC # BLD: 2.24 K/UL — LOW (ref 3.8–10.5)
WBC # BLD: 2.71 K/UL — LOW (ref 3.8–10.5)
WBC # FLD AUTO: 2.24 K/UL — LOW (ref 3.8–10.5)
WBC # FLD AUTO: 2.71 K/UL — LOW (ref 3.8–10.5)

## 2017-06-17 PROCEDURE — 99232 SBSQ HOSP IP/OBS MODERATE 35: CPT | Mod: GC

## 2017-06-17 PROCEDURE — 99231 SBSQ HOSP IP/OBS SF/LOW 25: CPT | Mod: GC

## 2017-06-17 RX ORDER — ONDANSETRON 8 MG/1
4 TABLET, FILM COATED ORAL ONCE
Qty: 0 | Refills: 0 | Status: COMPLETED | OUTPATIENT
Start: 2017-06-17 | End: 2017-06-17

## 2017-06-17 RX ADMIN — Medication 200 MILLIGRAM(S): at 17:38

## 2017-06-17 RX ADMIN — CINACALCET 30 MILLIGRAM(S): 30 TABLET, FILM COATED ORAL at 17:38

## 2017-06-17 RX ADMIN — Medication 300 MILLIGRAM(S): at 05:17

## 2017-06-17 RX ADMIN — PANTOPRAZOLE SODIUM 40 MILLIGRAM(S): 20 TABLET, DELAYED RELEASE ORAL at 05:17

## 2017-06-17 RX ADMIN — Medication 5 MILLIGRAM(S): at 05:17

## 2017-06-17 RX ADMIN — Medication 300 MILLIGRAM(S): at 17:38

## 2017-06-17 RX ADMIN — ONDANSETRON 4 MILLIGRAM(S): 8 TABLET, FILM COATED ORAL at 06:21

## 2017-06-17 RX ADMIN — Medication 90 MILLIGRAM(S): at 05:17

## 2017-06-17 RX ADMIN — GABAPENTIN 100 MILLIGRAM(S): 400 CAPSULE ORAL at 21:16

## 2017-06-17 RX ADMIN — Medication 1 MILLIGRAM(S): at 15:12

## 2017-06-17 RX ADMIN — GABAPENTIN 100 MILLIGRAM(S): 400 CAPSULE ORAL at 05:17

## 2017-06-17 RX ADMIN — GABAPENTIN 100 MILLIGRAM(S): 400 CAPSULE ORAL at 15:12

## 2017-06-17 RX ADMIN — Medication 100 MILLIGRAM(S): at 17:38

## 2017-06-17 RX ADMIN — Medication 100 MILLIGRAM(S): at 05:17

## 2017-06-17 RX ADMIN — ONDANSETRON 4 MILLIGRAM(S): 8 TABLET, FILM COATED ORAL at 19:47

## 2017-06-17 NOTE — DISCHARGE NOTE ADULT - MEDICATION SUMMARY - MEDICATIONS TO TAKE
I will START or STAY ON the medications listed below when I get home from the hospital:    predniSONE 5 mg oral tablet  -- 1 tab(s) by mouth once a day  -- Indication: For SLE exacerbation    gabapentin 100 mg oral capsule  -- 1 cap(s) by mouth 3 times a day  -- Indication: For SLE exacerbation    hydroxychloroquine 200 mg oral tablet  -- 1 tab(s) by mouth once a day  -- Indication: For Prophylactic measure    labetalol 300 mg oral tablet  -- 1 tab(s) by mouth 2 times a day  -- Indication: For Essential hypertension    NIFEdipine 90 mg oral tablet, extended release  -- 1 tab(s) by mouth once a day  -- Indication: For Essential hypertension    Sensipar 30 mg oral tablet  -- 1 tab(s) by mouth once a day  -- Indication: For Prophylactic measure    omeprazole 40 mg oral delayed release capsule  -- 1 cap(s) by mouth once a day  -- Indication: For Prophylactic measure    hydrALAZINE 100 mg oral tablet  -- 1 tab(s) by mouth 2 times a day  -- Indication: For Essential hypertension    folic acid 1 mg oral tablet  -- 1 tab(s) by mouth once a day  -- Indication: For Prophylactic measure

## 2017-06-17 NOTE — CONSULT NOTE ADULT - SUBJECTIVE AND OBJECTIVE BOX
HISTORY OF PRESENT ILLNESS:  26y F with SLE dx  (malar rash, discoid, scaring alopecia, arthritis, Class IV-V nephritis now ESRD, TMA, colitis), intitally treated with steroids and CellCept however developed worsening renal failure needing HD, last bx c/w DPGN with crescents and TMA in  -  treated with pulse and RTX.  Last in 2016 had colitis with jejunal ulcerations and TMA on bx.    Currently presents due to missing three treatments of outpatient HD due to worsening LBP and myalgias, which she said happens during her flares.  She missed further HD sessions due to transportation.  Currently reports mild arthralgias, rash, and SOB - denies malar rash, photosensitivity, alopecia, constitutional sxs, CP. Admitted for urgent HD and eval for SLE    PAST MEDICAL & SURGICAL HISTORY:  ESRD (end stage renal disease) on dialysis: 2/2 microangiopathy  Microangiopathy  Lupus nephritis  Essential Hypertension, Benign  Systemic Lupus Erythematosus  A-V fistula  Hemorrhage following kidney biopsy  Renal osteodystrophy  Recurrent C diff colitis        REVIEW OF SYSTEMS      General:	no fever, chills, wt loss, weakness  	  ENMT:	no ulcers    Respiratory and Thorax: mild SOB, no cough, no pleuritc CP  	  Cardiovascular:	no CP    Gastrointestinal:	 no N/V/D    Musculoskeletal:	lower back pain, arthralgias    Skin: rash, no malar rash, no ulcerations    MEDICATIONS  (STANDING):  predniSONE   Tablet 5milliGRAM(s) Oral daily  gabapentin 100milliGRAM(s) Oral three times a day  hydroxychloroquine 200milliGRAM(s) Oral daily  labetalol 300milliGRAM(s) Oral two times a day  NIFEdipine XL 90milliGRAM(s) Oral daily  cinacalcet 30milliGRAM(s) Oral daily  pantoprazole    Tablet 40milliGRAM(s) Oral before breakfast  hydrALAZINE 100milliGRAM(s) Oral every 12 hours  folic acid 1milliGRAM(s) Oral daily    MEDICATIONS  (PRN):  oxyCODONE  5 mG/acetaminophen 325 mG 1Tablet(s) Oral every 6 hours PRN Moderate Pain (4 - 6)      Allergies    No Known Allergies    Intolerances        PERTINENT MEDICATION HISTORY:    SOCIAL HISTORY:    FAMILY HISTORY:  Family history of hypertension (Aunt)      Vital Signs Last 24 Hrs  T(C): 36.6, Max: 36.9 (-17 @ 00:11)  T(F): 97.8, Max: 98.4 ( @ 00:11)  HR: 76 (76 - 104)  BP: 158/88 (126/78 - 160/104)  BP(mean): --  RR: 18 (15 - 20)  SpO2: 99% (98% - 100%)    Daily Height in cm: 167.64 (2017 00:58)    Daily Weight in k.3 (2017 00:11)    PHYSICAL EXAM:      Constitutional: NAD    Eyes: EOMI    ENMT: no ulcers    Back: paraspinal lumbar tenderness    Respiratory: CTABL    Cardiovascular: nl S1S2, RRR    Gastrointestinal: Soft NT    Vascular: no edema, RUE AVF    Skin: faint macular rash    Lymph Nodes: no LAD    Musculoskeletal: tender MCPs, wrists        LABS:                        7.3    2.71  )-----------( 160      ( 2017 06:06 )             22.4         140  |  98  |  24<H>  ----------------------------<  81  3.6   |  26  |  6.04<H>    Ca    8.9      2017 06:06    TPro  7.7  /  Alb  4.2  /  TBili  0.3  /  DBili  x   /  AST  21  /  ALT  15  /  AlkPhos  101  06-16    Sedimentation Rate, Erythrocyte: 74 mm/hr <H> [4 - 25] ( @ 06:06)  C-Reactive Protein, Serum: 10.3 mg/L <H> [0.3 - 5.0] ( @ 06:06)  C3 Complement, Serum: 67.1 mg/dL <L> [90 - 180] ( @ 06:06)        RADIOLOGY & ADDITIONAL STUDIES: HISTORY OF PRESENT ILLNESS:  26y F with SLE dx  (malar rash, discoid, scaring alopecia, arthritis, Class IV-V nephritis now ESRD, TMA, colitis), intitally treated with steroids and CellCept however developed worsening renal failure needing HD, last bx c/w DPGN with crescents and TMA in  -  treated with pulse and RTX.  Last in 2016 had colitis with jejunal ulcerations and TMA on bx.    Currently presents due to missing three treatments of outpatient HD due to worsening LBP and some mild chronic arthralgias which are not new. She feels the pains are not c/w her usual SLE flares. She missed further HD sessions due to transportation.  Currently reports mild arthralgias, faint LE rash that is mildly pruritic at times, and SOB - denies malar rash, photosensitivity, alopecia, constitutional sxs, CP. Admitted for urgent HD and eval for SLE.  Feels significantly improved after HD now. Reports mild reflux symptoms and wants to take her PPI.    PAST MEDICAL & SURGICAL HISTORY:  ESRD (end stage renal disease) on dialysis: 2/2 microangiopathy  Microangiopathy  Lupus nephritis  Essential Hypertension, Benign  Systemic Lupus Erythematosus  A-V fistula  Hemorrhage following kidney biopsy  Renal osteodystrophy  Recurrent C diff colitis        REVIEW OF SYSTEMS      General:	no fever, chills, wt loss, weakness  	  ENMT:	no ulcers    Respiratory and Thorax: mild SOB, no cough, no pleuritc CP  	  Cardiovascular:	no CP    Gastrointestinal:	 no N/V/D    Musculoskeletal:	lower back pain, arthralgias    Skin: rash, no malar rash, no ulcerations    MEDICATIONS  (STANDING):  predniSONE   Tablet 5milliGRAM(s) Oral daily  gabapentin 100milliGRAM(s) Oral three times a day  hydroxychloroquine 200milliGRAM(s) Oral daily  labetalol 300milliGRAM(s) Oral two times a day  NIFEdipine XL 90milliGRAM(s) Oral daily  cinacalcet 30milliGRAM(s) Oral daily  pantoprazole    Tablet 40milliGRAM(s) Oral before breakfast  hydrALAZINE 100milliGRAM(s) Oral every 12 hours  folic acid 1milliGRAM(s) Oral daily    MEDICATIONS  (PRN):  oxyCODONE  5 mG/acetaminophen 325 mG 1Tablet(s) Oral every 6 hours PRN Moderate Pain (4 - 6)      Allergies    No Known Allergies    Intolerances        PERTINENT MEDICATION HISTORY:    SOCIAL HISTORY:    FAMILY HISTORY:  Family history of hypertension (Aunt)      Vital Signs Last 24 Hrs  T(C): 36.6, Max: 36.9 ( @ 00:11)  T(F): 97.8, Max: 98.4 ( @ 00:11)  HR: 76 (76 - 104)  BP: 158/88 (126/78 - 160/104)  BP(mean): --  RR: 18 (15 - 20)  SpO2: 99% (98% - 100%)    Daily Height in cm: 167.64 (2017 00:58)    Daily Weight in k.3 (2017 00:11)    PHYSICAL EXAM:      Constitutional: NAD    Eyes: EOMI    ENMT: no ulcers    Back: paraspinal lumbar tenderness    Respiratory: CTABL    Cardiovascular: nl S1S2, RRR    Gastrointestinal: Soft NT    Vascular: no edema, RUE AVF    Skin: faint macular rash on LE    Lymph Nodes: no LAD    Musculoskeletal: slightly tender MCPs, wrists without active synovitis        LABS:                        7.3    2.71  )-----------( 160      ( 2017 06:06 )             22.4         140  |  98  |  24<H>  ----------------------------<  81  3.6   |  26  |  6.04<H>    Ca    8.9      2017 06:06    TPro  7.7  /  Alb  4.2  /  TBili  0.3  /  DBili  x   /  AST  21  /  ALT  15  /  AlkPhos  101  06-16    Sedimentation Rate, Erythrocyte: 74 mm/hr <H> [4 - 25] ( @ 06:06)  C-Reactive Protein, Serum: 10.3 mg/L <H> [0.3 - 5.0] ( @ 06:06)  C3 Complement, Serum: 67.1 mg/dL <L> [90 - 180] ( @ 06:06)        RADIOLOGY & ADDITIONAL STUDIES:

## 2017-06-17 NOTE — PROGRESS NOTE ADULT - SUBJECTIVE AND OBJECTIVE BOX
Patient is a 26y old  Female who presents with a chief complaint of " I missed dialysis" (16 Jun 2017 18:59)    doing ok  no SOB       Any change in ROS:     MEDICATIONS  (STANDING):  predniSONE   Tablet 5milliGRAM(s) Oral daily  gabapentin 100milliGRAM(s) Oral three times a day  hydroxychloroquine 200milliGRAM(s) Oral daily  labetalol 300milliGRAM(s) Oral two times a day  NIFEdipine XL 90milliGRAM(s) Oral daily  cinacalcet 30milliGRAM(s) Oral daily  pantoprazole    Tablet 40milliGRAM(s) Oral before breakfast  hydrALAZINE 100milliGRAM(s) Oral every 12 hours  folic acid 1milliGRAM(s) Oral daily    MEDICATIONS  (PRN):  oxyCODONE  5 mG/acetaminophen 325 mG 1Tablet(s) Oral every 6 hours PRN Moderate Pain (4 - 6)    Vital Signs Last 24 Hrs  T(C): 36.9, Max: 36.9 (06-17 @ 00:11)  T(F): 98.4, Max: 98.4 (06-17 @ 00:11)  HR: 97 (76 - 97)  BP: 148/85 (138/92 - 160/104)  BP(mean): --  RR: 18 (16 - 20)  SpO2: 99% (98% - 100%)    I&O's Summary    I & Os for current day (as of 17 Jun 2017 14:33)  =============================================  IN: 400 ml / OUT: 2200 ml / NET: -1800 ml        Physical Exam:   GENERAL: NAD, well-groomed, well-developed  HEENT: MEETA/   Atraumatic, Normocephalic  ENMT: No tonsillar erythema, exudates, or enlargement; Moist mucous membranes, Good dentition, No lesions  NECK: Supple, No JVD, Normal thyroid  CHEST/LUNG: Clear to percussion bilaterally; No rales, rhonchi, wheezing, or rubs  CVS: Regular rate and rhythm; No murmurs, rubs, or gallops  GI: : Soft, Nontender, Nondistended; Bowel sounds present  NERVOUS SYSTEM:  Alert & Oriented X3, Good concentration; Motor Strength 5/5 B/L upper and lower extremities; DTRs 2+ intact and symmetric  EXTREMITIES:  2+ Peripheral Pulses, No clubbing, cyanosis, or edema  LYMPH: No lymphadenopathy noted  SKIN: No rashes or lesions  ENDOCRINOLOGY: No Thyromegaly  PSYCH: Appropriate    Labs:               7.3    2.71  )-----------( 160      ( 17 Jun 2017 06:06 )             22.4                         8.3    4.90  )-----------( 204      ( 16 Jun 2017 14:20 )             25.2     06-17    140  |  98  |  24<H>  ----------------------------<  81  3.6   |  26  |  6.04<H>  06-16    135  |  95<L>  |  68<H>  ----------------------------<  66<L>  4.6   |  17<L>  |  12.46<H>    Ca    8.9      17 Jun 2017 06:06  Ca    8.9      16 Jun 2017 14:20    TPro  7.7  /  Alb  4.2  /  TBili  0.3  /  DBili  x   /  AST  21  /  ALT  15  /  AlkPhos  101  06-16    CAPILLARY BLOOD GLUCOSE      LIVER FUNCTIONS - ( 16 Jun 2017 14:20 )  Alb: 4.2 g/dL / Pro: 7.7 g/dL / ALK PHOS: 101 u/L / ALT: 15 u/L / AST: 21 u/L / GGT: x               Serum Pro-Brain Natriuretic Peptide: 31287 pg/mL (06-16 @ 14:20)  Cultures:                             Studies  Chest X-RAY  CT SCAN Chest EXAM:  RAD CHEST PA LAT        PROCEDURE DATE:  Jun 16 2017         INTERPRETATION:    CLINICAL INDICATION: Shortness of breath    TECHNIQUE: PA and lateral views of the chest.    COMPARISON: Chest radiograph from 1/6/2017.    IMPRESSION:  Clear lungs. No pleural effusions or pneumothorax.    Enlarged cardiac and mediastinal silhouettes, notably increased from   prior, component of pericardial effusion cannot be excluded, correlate   clinically with echocardiography suggested to further assess as warranted.    Trachea midline.    Unremarkable osseous structures.     Discussed with ADS DIANE Encarnacion on 6/17/2017 at 1035 with read back.  Venous Dopplers: LE:   CT Abdomen  Others      IMPRESSION:  Clear lungs. No pleural effusions or pneumothorax.    Enlarged cardiac and mediastinal silhouettes, notably increased from   prior, component of pericardial effusion cannot be excluded, correlate   clinically with echocardiography suggested to further assess as warranted.    Trachea midline.    Unremarkable osseous structures.     Discussed with ADS DIANE Encarnacion on 6/17/2017 at 1035 with read back.

## 2017-06-17 NOTE — CONSULT NOTE ADULT - ASSESSMENT
26y F with SLE (malar rash, discoid, scaring alopecia, arthritis, Class IV-V nephritis now ESRD, TMA, colitis) p/w 3 missed HD sessions and c/o LBP, chronic arthralgias and faint rash.  Pt has had history of chronic arthralgias likely due to hyperparathytroidism / renal osteodystrophy.    Plan:  -check C3, C4, dsDNA to eval for serological activity  - c/w home dose 5mg prednisone and HCQ 200mg/d for now 26y F with SLE (malar rash, discoid, scaring alopecia, arthritis, Class IV-V nephritis now ESRD, TMA, colitis) p/w 3 missed HD sessions and c/o LBP, chronic arthralgias and faint LE rash.  Pt has had history of chronic arthralgias likely due to hyperparathytroidism / renal osteodystrophy.  HD as per renal.    Plan:  -check C3, C4, dsDNA to eval for serological activity  - c/w home dose 8mg Medrol and HCQ 200mg/d for now

## 2017-06-17 NOTE — DISCHARGE NOTE ADULT - CARE PROVIDER_API CALL
Louisa Leonardo), Internal Medicine; Rheumatology  [address] [place] [address]  Phone: [phone]  Fax: [phone]

## 2017-06-17 NOTE — DISCHARGE NOTE ADULT - PATIENT PORTAL LINK FT
“You can access the [facility] Patient Portal, offered by [facility], by registering with the following website: [email or web address]”

## 2017-06-17 NOTE — DISCHARGE NOTE ADULT - HOSPITAL COURSE
26 y.o female PMH Lupus, ESRD on HD (Tu,Th,Sa) presents for dialysis after missing 3 dialysis sessions.  Her last HD session completed was Thursday, June 8.  On June 9 she started having a Lupus flare causing her back and joint pain and she missed her subsequent HD sessions.    Patient was evaluated by rheumatology today - spoke with rheumatology - patient cleared for discharge, and can have rheum follow up via outpatient;  spoke to  [place]ona in regards to reinstating dialysis prior to official discharge. patient aware of plans and comfortable with it. Attending Dr. Iqbal also agrees with above plan of care.    6/17 @ 3178 - plan of care changed - monitor h&h and send occult blood prior to official discharge 26 y.o female PMH Lupus, ESRD on HD (Tu,Th,Sa) presents for dialysis after missing 3 dialysis sessions.  Her last HD session completed was Thursday, June 8.  On June 9 she started having a Lupus flare causing her back and joint pain and she missed her subsequent HD sessions.    Patient was evaluated by rheumatology today - spoke with rheumatology - patient cleared for discharge, and can have rheum follow up via outpatient;  spoke to  Keyona in regards to reinstating dialysis prior to official discharge. patient aware of plans and comfortable with it. Attending Dr. Iqbal also agrees with above plan of care.    6/17 @ 2256 - plan of care changed - monitor h&h and send occult blood prior to official discharge  6/18 - Patient insists on going home today, cleared for discharge as per Dr. Iqbal - advised patient to please follow up with primary care provider to obtain occult stool and/or trend h&h; as well as follow up with rheumatologist

## 2017-06-17 NOTE — DISCHARGE NOTE ADULT - PLAN OF CARE
evaluated by rheumatology - continue with your prednisone 5mg PO qdaily and Plaquenil for now Please follow up with rheumatology as outpatient as they discussed with you - call to confirm/schedule appointment within 2-3 days upon discharge continue current regimen Please follow up with your primary care provider and/or nephrologist within 2-3 days upon discharge Trending h&h, asymptomatic - patient insists on going home today Please follow up with your primary care provider via outpatient to trend h&h and occult blood stool. Call to schedule appointment

## 2017-06-17 NOTE — DISCHARGE NOTE ADULT - ADDITIONAL INSTRUCTIONS
Spoke with  today to reinstate patient's dialysis prior to official discharge Spoke with  today to reinstate patient's dialysis prior to official discharge  PLEASE OBTAIN A CBC BLOODWORK ON TUESDAY 6/20 TO TREND WHITE BLOOD COUNT (TRENDING LOW DURING HOSPITALIZATION) Spoke with  today to reinstate patient's dialysis prior to official discharge  PLEASE OBTAIN A CBC w diff BLOODWORK ON TUESDAY 6/20 TO TREND WHITE BLOOD COUNT (TRENDING LOW DURING HOSPITALIZATION)

## 2017-06-17 NOTE — DISCHARGE NOTE ADULT - CARE PLAN
Principal Discharge DX:	SLE exacerbation  Goal:	evaluated by rheumatology - continue with your prednisone 5mg PO qdaily and Plaquenil for now  Instructions for follow-up, activity and diet:	Please follow up with rheumatology as outpatient as they discussed with you - call to confirm/schedule appointment within 2-3 days upon discharge  Secondary Diagnosis:	ESRD (end stage renal disease) on dialysis  Goal:	continue current regimen  Instructions for follow-up, activity and diet:	Please follow up with your primary care provider and/or nephrologist within 2-3 days upon discharge Principal Discharge DX:	SLE exacerbation  Goal:	evaluated by rheumatology - continue with your prednisone 5mg PO qdaily and Plaquenil for now  Instructions for follow-up, activity and diet:	Please follow up with rheumatology as outpatient as they discussed with you - call to confirm/schedule appointment within 2-3 days upon discharge  Secondary Diagnosis:	ESRD (end stage renal disease) on dialysis  Goal:	continue current regimen  Instructions for follow-up, activity and diet:	Please follow up with your primary care provider and/or nephrologist within 2-3 days upon discharge  Secondary Diagnosis:	Anemia due to chronic kidney disease  Goal:	Trending h&h, asymptomatic - patient insists on going home today  Instructions for follow-up, activity and diet:	Please follow up with your primary care provider via outpatient to trend h&h and occult blood stool. Call to schedule appointment

## 2017-06-18 ENCOUNTER — TRANSCRIPTION ENCOUNTER (OUTPATIENT)
Age: 27
End: 2017-06-18

## 2017-06-18 VITALS
OXYGEN SATURATION: 100 % | SYSTOLIC BLOOD PRESSURE: 130 MMHG | DIASTOLIC BLOOD PRESSURE: 78 MMHG | TEMPERATURE: 98 F | HEART RATE: 82 BPM | RESPIRATION RATE: 18 BRPM

## 2017-06-18 LAB
ALBUMIN SERPL ELPH-MCNC: 3.6 G/DL — SIGNIFICANT CHANGE UP (ref 3.3–5)
ALP SERPL-CCNC: 81 U/L — SIGNIFICANT CHANGE UP (ref 40–120)
ALT FLD-CCNC: 16 U/L — SIGNIFICANT CHANGE UP (ref 4–33)
AST SERPL-CCNC: 24 U/L — SIGNIFICANT CHANGE UP (ref 4–32)
BASOPHILS # BLD AUTO: 0.01 K/UL — SIGNIFICANT CHANGE UP (ref 0–0.2)
BASOPHILS NFR BLD AUTO: 0.5 % — SIGNIFICANT CHANGE UP (ref 0–2)
BILIRUB SERPL-MCNC: 0.4 MG/DL — SIGNIFICANT CHANGE UP (ref 0.2–1.2)
BUN SERPL-MCNC: 15 MG/DL — SIGNIFICANT CHANGE UP (ref 7–23)
CALCIUM SERPL-MCNC: 8.4 MG/DL — SIGNIFICANT CHANGE UP (ref 8.4–10.5)
CHLORIDE SERPL-SCNC: 97 MMOL/L — LOW (ref 98–107)
CO2 SERPL-SCNC: 26 MMOL/L — SIGNIFICANT CHANGE UP (ref 22–31)
CREAT SERPL-MCNC: 4.43 MG/DL — HIGH (ref 0.5–1.3)
EOSINOPHIL # BLD AUTO: 0.04 K/UL — SIGNIFICANT CHANGE UP (ref 0–0.5)
EOSINOPHIL NFR BLD AUTO: 1.9 % — SIGNIFICANT CHANGE UP (ref 0–6)
GLUCOSE SERPL-MCNC: 77 MG/DL — SIGNIFICANT CHANGE UP (ref 70–99)
HCT VFR BLD CALC: 24.2 % — LOW (ref 34.5–45)
HGB BLD-MCNC: 7.9 G/DL — LOW (ref 11.5–15.5)
IMM GRANULOCYTES NFR BLD AUTO: 1.5 % — SIGNIFICANT CHANGE UP (ref 0–1.5)
LYMPHOCYTES # BLD AUTO: 0.67 K/UL — LOW (ref 1–3.3)
LYMPHOCYTES # BLD AUTO: 32.5 % — SIGNIFICANT CHANGE UP (ref 13–44)
MANUAL SMEAR VERIFICATION: SIGNIFICANT CHANGE UP
MCHC RBC-ENTMCNC: 28.3 PG — SIGNIFICANT CHANGE UP (ref 27–34)
MCHC RBC-ENTMCNC: 32.6 % — SIGNIFICANT CHANGE UP (ref 32–36)
MCV RBC AUTO: 86.7 FL — SIGNIFICANT CHANGE UP (ref 80–100)
MONOCYTES # BLD AUTO: 0.4 K/UL — SIGNIFICANT CHANGE UP (ref 0–0.9)
MONOCYTES NFR BLD AUTO: 19.4 % — HIGH (ref 2–14)
NEUTROPHILS # BLD AUTO: 0.91 K/UL — LOW (ref 1.8–7.4)
NEUTROPHILS NFR BLD AUTO: 44.2 % — SIGNIFICANT CHANGE UP (ref 43–77)
PLATELET # BLD AUTO: 155 K/UL — SIGNIFICANT CHANGE UP (ref 150–400)
PMV BLD: 8.9 FL — SIGNIFICANT CHANGE UP (ref 7–13)
POTASSIUM SERPL-MCNC: 3.7 MMOL/L — SIGNIFICANT CHANGE UP (ref 3.5–5.3)
POTASSIUM SERPL-SCNC: 3.7 MMOL/L — SIGNIFICANT CHANGE UP (ref 3.5–5.3)
PROT SERPL-MCNC: 6.6 G/DL — SIGNIFICANT CHANGE UP (ref 6–8.3)
RBC # BLD: 2.79 M/UL — LOW (ref 3.8–5.2)
RBC # FLD: 17.9 % — HIGH (ref 10.3–14.5)
SODIUM SERPL-SCNC: 139 MMOL/L — SIGNIFICANT CHANGE UP (ref 135–145)
WBC # BLD: 2.06 K/UL — LOW (ref 3.8–10.5)
WBC # FLD AUTO: 2.06 K/UL — LOW (ref 3.8–10.5)

## 2017-06-18 RX ORDER — HYDRALAZINE HCL 50 MG
1 TABLET ORAL
Qty: 90 | Refills: 0 | COMMUNITY

## 2017-06-18 RX ORDER — HYDRALAZINE HCL 50 MG
1 TABLET ORAL
Qty: 90 | Refills: 0 | OUTPATIENT
Start: 2017-06-18 | End: 2017-07-18

## 2017-06-18 RX ADMIN — Medication 200 MILLIGRAM(S): at 12:43

## 2017-06-18 RX ADMIN — Medication 1 MILLIGRAM(S): at 12:44

## 2017-06-18 RX ADMIN — Medication 5 MILLIGRAM(S): at 05:20

## 2017-06-18 RX ADMIN — CINACALCET 30 MILLIGRAM(S): 30 TABLET, FILM COATED ORAL at 12:43

## 2017-06-18 RX ADMIN — Medication 100 MILLIGRAM(S): at 05:25

## 2017-06-18 RX ADMIN — GABAPENTIN 100 MILLIGRAM(S): 400 CAPSULE ORAL at 05:19

## 2017-06-18 RX ADMIN — GABAPENTIN 100 MILLIGRAM(S): 400 CAPSULE ORAL at 12:44

## 2017-06-18 RX ADMIN — Medication 90 MILLIGRAM(S): at 05:20

## 2017-06-18 RX ADMIN — Medication 300 MILLIGRAM(S): at 05:19

## 2017-06-18 RX ADMIN — PANTOPRAZOLE SODIUM 40 MILLIGRAM(S): 20 TABLET, DELAYED RELEASE ORAL at 05:25

## 2017-06-18 NOTE — PROGRESS NOTE ADULT - SUBJECTIVE AND OBJECTIVE BOX
Patient is a 26y old  Female who presents with a chief complaint of " I missed dialysis" (17 Jun 2017 14:31)    pt really wants to go home  no signs of active bleeding  could not give FOBT       Any change in ROS:     MEDICATIONS  (STANDING):  predniSONE   Tablet 5milliGRAM(s) Oral daily  gabapentin 100milliGRAM(s) Oral three times a day  hydroxychloroquine 200milliGRAM(s) Oral daily  labetalol 300milliGRAM(s) Oral two times a day  NIFEdipine XL 90milliGRAM(s) Oral daily  cinacalcet 30milliGRAM(s) Oral daily  pantoprazole    Tablet 40milliGRAM(s) Oral before breakfast  hydrALAZINE 100milliGRAM(s) Oral every 12 hours  folic acid 1milliGRAM(s) Oral daily    MEDICATIONS  (PRN):  oxyCODONE  5 mG/acetaminophen 325 mG 1Tablet(s) Oral every 6 hours PRN Moderate Pain (4 - 6)    Vital Signs Last 24 Hrs  T(C): 37.2, Max: 37.5 (06-17 @ 21:09)  T(F): 98.9, Max: 99.5 (06-17 @ 21:09)  HR: 89 (89 - 97)  BP: 140/79 (139/86 - 156/84)  BP(mean): --  RR: 18 (17 - 18)  SpO2: 99% (99% - 100%)    I&O's Summary    I & Os for current day (as of 18 Jun 2017 11:03)  =============================================  IN: 400 ml / OUT: 2400 ml / NET: -2000 ml        Physical Exam:   GENERAL: NAD, well-groomed, well-developed  HEENT: MEETA/   Atraumatic, Normocephalic  ENMT: No tonsillar erythema, exudates, or enlargement; Moist mucous membranes, Good dentition, No lesions  NECK: Supple, No JVD, Normal thyroid  CHEST/LUNG: Clear to percussion bilaterally; No rales, rhonchi, wheezing, or rubs  CVS: Regular rate and rhythm; No murmurs, rubs, or gallops  GI: : Soft, Nontender, Nondistended; Bowel sounds present  NERVOUS SYSTEM:  Alert & Oriented X3, Good concentration; Motor Strength 5/5 B/L upper and lower extremities; DTRs 2+ intact and symmetric  EXTREMITIES:  2+ Peripheral Pulses, No clubbing, cyanosis, or edema  LYMPH: No lymphadenopathy noted  SKIN: No rashes or lesions  ENDOCRINOLOGY: No Thyromegaly  PSYCH: Appropriate/demented/others    Labs:                              7.9    2.06  )-----------( 155      ( 18 Jun 2017 05:56 )             24.2                         8.1    2.24  )-----------( 147      ( 17 Jun 2017 22:12 )             25.4                         7.3    2.71  )-----------( 160      ( 17 Jun 2017 06:06 )             22.4                         8.3    4.90  )-----------( 204      ( 16 Jun 2017 14:20 )             25.2     06-18    139  |  97<L>  |  15  ----------------------------<  77  3.7   |  26  |  4.43<H>  06-17    140  |  96<L>  |  13  ----------------------------<  83  3.7   |  27  |  3.77<H>  06-17    140  |  98  |  24<H>  ----------------------------<  81  3.6   |  26  |  6.04<H>  06-16    135  |  95<L>  |  68<H>  ----------------------------<  66<L>  4.6   |  17<L>  |  12.46<H>    Ca    8.4      18 Jun 2017 05:56  Ca    8.8      17 Jun 2017 22:12  Ca    8.9      17 Jun 2017 06:06  Ca    8.9      16 Jun 2017 14:20    TPro  6.6  /  Alb  3.6  /  TBili  0.4  /  DBili  x   /  AST  24  /  ALT  16  /  AlkPhos  81  06-18  TPro  7.0  /  Alb  3.9  /  TBili  0.4  /  DBili  x   /  AST  25  /  ALT  19  /  AlkPhos  89  06-17  TPro  7.7  /  Alb  4.2  /  TBili  0.3  /  DBili  x   /  AST  21  /  ALT  15  /  AlkPhos  101  06-16    CAPILLARY BLOOD GLUCOSE      LIVER FUNCTIONS - ( 18 Jun 2017 05:56 )  Alb: 3.6 g/dL / Pro: 6.6 g/dL / ALK PHOS: 81 u/L / ALT: 16 u/L / AST: 24 u/L / GGT: x               Serum Pro-Brain Natriuretic Peptide: 49908 pg/mL (06-16 @ 14:20)  Cultures:                             Studies  Chest X-RAY  CT SCAN Chest   Venous Dopplers: LE:   CT Abdomen  Others

## 2017-06-18 NOTE — PROGRESS NOTE ADULT - ATTENDING COMMENTS
FOBT could not be checked as patient did not give any sample: She insists on leaving today. I have told her that she must follow with her PCP and check her stool for guaiac, and if positive then she will need colonoscopy. She understands clearly and says she will follow up soon.  Will dc today FOBT could not be checked as patient did not give any sample: She insists on leaving today. I have told her that she must follow with her PCP and check her stool for guaiac, and if positive then she will need colonoscopy. She understands clearly and says she will follow up soon.  Will dc today.  WBC count decreasing so will repeat her WBC and hemoglobin on Tuesday: Pt has been explained thoroughly and is being discharged as she insists being going home and following up as an outpatient.

## 2017-06-20 ENCOUNTER — RX RENEWAL (OUTPATIENT)
Age: 27
End: 2017-06-20

## 2017-06-21 ENCOUNTER — APPOINTMENT (OUTPATIENT)
Dept: INFECTIOUS DISEASE | Facility: CLINIC | Age: 27
End: 2017-06-21

## 2017-06-22 LAB — DSDNA AB SER-ACNC: 32 IU/ML — HIGH

## 2017-06-23 LAB
BLD GP AB SCN SERPL QL: NEGATIVE — SIGNIFICANT CHANGE UP
HGB BLD-MCNC: 8.1 G/DL — LOW (ref 11.5–15.5)
RH IG SCN BLD-IMP: POSITIVE — SIGNIFICANT CHANGE UP

## 2017-06-28 ENCOUNTER — RX RENEWAL (OUTPATIENT)
Age: 27
End: 2017-06-28

## 2017-07-06 ENCOUNTER — RX RENEWAL (OUTPATIENT)
Age: 27
End: 2017-07-06

## 2017-07-20 ENCOUNTER — RX RENEWAL (OUTPATIENT)
Age: 27
End: 2017-07-20

## 2017-08-25 ENCOUNTER — APPOINTMENT (OUTPATIENT)
Dept: RHEUMATOLOGY | Facility: CLINIC | Age: 27
End: 2017-08-25
Payer: MEDICAID

## 2017-08-25 VITALS
HEIGHT: 66 IN | SYSTOLIC BLOOD PRESSURE: 147 MMHG | OXYGEN SATURATION: 95 % | TEMPERATURE: 98.1 F | HEART RATE: 83 BPM | DIASTOLIC BLOOD PRESSURE: 90 MMHG | WEIGHT: 124 LBS | BODY MASS INDEX: 19.93 KG/M2

## 2017-08-25 DIAGNOSIS — R51 HEADACHE: ICD-10-CM

## 2017-08-25 PROCEDURE — 99214 OFFICE O/P EST MOD 30 MIN: CPT

## 2017-08-28 ENCOUNTER — OUTPATIENT (OUTPATIENT)
Dept: OUTPATIENT SERVICES | Facility: HOSPITAL | Age: 27
LOS: 1 days | End: 2017-08-28

## 2017-08-28 DIAGNOSIS — R51 HEADACHE: ICD-10-CM

## 2017-08-28 DIAGNOSIS — I77.0 ARTERIOVENOUS FISTULA, ACQUIRED: Chronic | ICD-10-CM

## 2017-08-28 LAB
ALBUMIN SERPL ELPH-MCNC: 4.5 G/DL
ALP BLD-CCNC: 109 U/L
ALT SERPL-CCNC: 19 U/L
ANION GAP SERPL CALC-SCNC: 19 MMOL/L
AST SERPL-CCNC: 21 U/L
BASOPHILS # BLD AUTO: 0.01 K/UL
BASOPHILS NFR BLD AUTO: 0.3 %
BILIRUB SERPL-MCNC: 0.3 MG/DL
BUN SERPL-MCNC: 21 MG/DL
C3 SERPL-MCNC: 78 MG/DL
C4 SERPL-MCNC: 29 MG/DL
CALCIUM SERPL-MCNC: 9.9 MG/DL
CHLORIDE SERPL-SCNC: 96 MMOL/L
CO2 SERPL-SCNC: 28 MMOL/L
CREAT SERPL-MCNC: 5.83 MG/DL
DSDNA AB SER-ACNC: 52 IU/ML
EOSINOPHIL # BLD AUTO: 0.09 K/UL
EOSINOPHIL NFR BLD AUTO: 2.9 %
GLUCOSE SERPL-MCNC: 71 MG/DL
HCT VFR BLD CALC: 40 %
HGB BLD-MCNC: 12.7 G/DL
IMM GRANULOCYTES NFR BLD AUTO: 0.6 %
LYMPHOCYTES # BLD AUTO: 0.89 K/UL
LYMPHOCYTES NFR BLD AUTO: 28.6 %
MAN DIFF?: NORMAL
MCHC RBC-ENTMCNC: 29.9 PG
MCHC RBC-ENTMCNC: 31.8 GM/DL
MCV RBC AUTO: 94.1 FL
MONOCYTES # BLD AUTO: 0.28 K/UL
MONOCYTES NFR BLD AUTO: 9 %
NEUTROPHILS # BLD AUTO: 1.82 K/UL
NEUTROPHILS NFR BLD AUTO: 58.6 %
PLATELET # BLD AUTO: 253 K/UL
POTASSIUM SERPL-SCNC: 4 MMOL/L
PROT SERPL-MCNC: 7.9 G/DL
RBC # BLD: 4.25 M/UL
RBC # FLD: 16.4 %
SODIUM SERPL-SCNC: 143 MMOL/L
WBC # FLD AUTO: 3.11 K/UL

## 2017-08-29 LAB — CORE LAB FLUID CYTOLOGY: NORMAL

## 2017-08-30 LAB — HLX UV FISH FINAL REPORT: NORMAL

## 2017-09-21 ENCOUNTER — RX RENEWAL (OUTPATIENT)
Age: 27
End: 2017-09-21

## 2017-11-22 ENCOUNTER — APPOINTMENT (OUTPATIENT)
Dept: RHEUMATOLOGY | Facility: CLINIC | Age: 27
End: 2017-11-22

## 2017-12-19 ENCOUNTER — APPOINTMENT (OUTPATIENT)
Dept: RHEUMATOLOGY | Facility: CLINIC | Age: 27
End: 2017-12-19

## 2018-01-11 ENCOUNTER — OUTPATIENT (OUTPATIENT)
Dept: OUTPATIENT SERVICES | Facility: HOSPITAL | Age: 28
LOS: 1 days | End: 2018-01-11

## 2018-01-11 DIAGNOSIS — I77.0 ARTERIOVENOUS FISTULA, ACQUIRED: Chronic | ICD-10-CM

## 2018-01-11 DIAGNOSIS — D63.1 ANEMIA IN CHRONIC KIDNEY DISEASE: ICD-10-CM

## 2018-01-11 LAB — HGB BLD-MCNC: 8.9 G/DL — LOW (ref 11.5–15.5)

## 2018-01-17 ENCOUNTER — LABORATORY RESULT (OUTPATIENT)
Age: 28
End: 2018-01-17

## 2018-01-17 ENCOUNTER — APPOINTMENT (OUTPATIENT)
Dept: RHEUMATOLOGY | Facility: CLINIC | Age: 28
End: 2018-01-17
Payer: MEDICAID

## 2018-01-17 VITALS
TEMPERATURE: 98 F | DIASTOLIC BLOOD PRESSURE: 102 MMHG | WEIGHT: 124 LBS | SYSTOLIC BLOOD PRESSURE: 146 MMHG | RESPIRATION RATE: 16 BRPM | HEART RATE: 87 BPM | HEIGHT: 66 IN | OXYGEN SATURATION: 97 % | BODY MASS INDEX: 19.93 KG/M2

## 2018-01-17 DIAGNOSIS — R10.9 UNSPECIFIED ABDOMINAL PAIN: ICD-10-CM

## 2018-01-17 DIAGNOSIS — M25.551 PAIN IN RIGHT HIP: ICD-10-CM

## 2018-01-17 DIAGNOSIS — M25.552 PAIN IN RIGHT HIP: ICD-10-CM

## 2018-01-17 DIAGNOSIS — M25.562 PAIN IN RIGHT KNEE: ICD-10-CM

## 2018-01-17 DIAGNOSIS — R00.8 OTHER ABNORMALITIES OF HEART BEAT: ICD-10-CM

## 2018-01-17 DIAGNOSIS — R00.2 PALPITATIONS: ICD-10-CM

## 2018-01-17 DIAGNOSIS — M25.561 PAIN IN RIGHT KNEE: ICD-10-CM

## 2018-01-17 PROCEDURE — 99215 OFFICE O/P EST HI 40 MIN: CPT

## 2018-01-18 LAB
ALBUMIN SERPL ELPH-MCNC: 4.6 G/DL
ALP BLD-CCNC: 99 U/L
ALT SERPL-CCNC: 13 U/L
ANION GAP SERPL CALC-SCNC: 17 MMOL/L
APPEARANCE: CLEAR
AST SERPL-CCNC: 22 U/L
BACTERIA: NEGATIVE
BASOPHILS # BLD AUTO: 0.05 K/UL
BASOPHILS NFR BLD AUTO: 1.7 %
BILIRUB SERPL-MCNC: 0.5 MG/DL
BILIRUBIN URINE: NEGATIVE
BLOOD URINE: NEGATIVE
BUN SERPL-MCNC: 36 MG/DL
C3 SERPL-MCNC: 74 MG/DL
C4 SERPL-MCNC: 23 MG/DL
CALCIUM SERPL-MCNC: 10 MG/DL
CHLORIDE SERPL-SCNC: 95 MMOL/L
CO2 SERPL-SCNC: 29 MMOL/L
COLOR: YELLOW
CREAT SERPL-MCNC: 7.71 MG/DL
DSDNA AB SER-ACNC: 73 IU/ML
EOSINOPHIL # BLD AUTO: 0.05 K/UL
EOSINOPHIL NFR BLD AUTO: 1.7
ERYTHROCYTE [SEDIMENTATION RATE] IN BLOOD BY WESTERGREN METHOD: 52 MM/HR
FERRITIN SERPL-MCNC: 579 NG/ML
GLUCOSE QUALITATIVE U: NEGATIVE MG/DL
GLUCOSE SERPL-MCNC: 86 MG/DL
HCT VFR BLD CALC: 31.6 %
HGB BLD-MCNC: 10.1 G/DL
HYALINE CASTS: 0 /LPF
KETONES URINE: NEGATIVE
LEUKOCYTE ESTERASE URINE: NEGATIVE
LYMPHOCYTES # BLD AUTO: 0.42 K/UL
LYMPHOCYTES NFR BLD AUTO: 14.8 %
MAN DIFF?: NORMAL
MCHC RBC-ENTMCNC: 29 PG
MCHC RBC-ENTMCNC: 32 GM/DL
MCV RBC AUTO: 90.8 FL
MICROSCOPIC-UA: NORMAL
MONOCYTES # BLD AUTO: 0.17 K/UL
MONOCYTES NFR BLD AUTO: 6.1 %
NEUTROPHILS # BLD AUTO: 2.09 K/UL
NEUTROPHILS NFR BLD AUTO: 74 %
NITRITE URINE: NEGATIVE
PH URINE: >8.5
PLATELET # BLD AUTO: 175 K/UL
POTASSIUM SERPL-SCNC: 4.8 MMOL/L
PROT SERPL-MCNC: 7.6 G/DL
PROTEIN URINE: 300 MG/DL
RBC # BLD: 3.48 M/UL
RBC # FLD: 18.9 %
RED BLOOD CELLS URINE: 1 /HPF
SODIUM SERPL-SCNC: 141 MMOL/L
SPECIFIC GRAVITY URINE: 1.01
SQUAMOUS EPITHELIAL CELLS: 5 /HPF
TSH SERPL-ACNC: 1.69 UIU/ML
UROBILINOGEN URINE: NEGATIVE MG/DL
WBC # FLD AUTO: 2.83 K/UL
WHITE BLOOD CELLS URINE: 1 /HPF

## 2018-01-31 ENCOUNTER — APPOINTMENT (OUTPATIENT)
Dept: RADIOLOGY | Facility: IMAGING CENTER | Age: 28
End: 2018-01-31

## 2018-01-31 ENCOUNTER — APPOINTMENT (OUTPATIENT)
Dept: ULTRASOUND IMAGING | Facility: IMAGING CENTER | Age: 28
End: 2018-01-31

## 2018-02-07 ENCOUNTER — FORM ENCOUNTER (OUTPATIENT)
Age: 28
End: 2018-02-07

## 2018-02-08 ENCOUNTER — OUTPATIENT (OUTPATIENT)
Dept: OUTPATIENT SERVICES | Facility: HOSPITAL | Age: 28
LOS: 1 days | End: 2018-02-08
Payer: MEDICARE

## 2018-02-08 ENCOUNTER — APPOINTMENT (OUTPATIENT)
Dept: RADIOLOGY | Facility: IMAGING CENTER | Age: 28
End: 2018-02-08
Payer: MEDICAID

## 2018-02-08 ENCOUNTER — INPATIENT (INPATIENT)
Facility: HOSPITAL | Age: 28
LOS: 1 days | Discharge: HOME | End: 2018-02-10
Attending: HOSPITALIST | Admitting: HOSPITALIST
Payer: MEDICARE

## 2018-02-08 ENCOUNTER — APPOINTMENT (OUTPATIENT)
Dept: ULTRASOUND IMAGING | Facility: IMAGING CENTER | Age: 28
End: 2018-02-08
Payer: MEDICAID

## 2018-02-08 VITALS
HEART RATE: 75 BPM | TEMPERATURE: 99 F | SYSTOLIC BLOOD PRESSURE: 180 MMHG | OXYGEN SATURATION: 100 % | RESPIRATION RATE: 16 BRPM | DIASTOLIC BLOOD PRESSURE: 105 MMHG

## 2018-02-08 DIAGNOSIS — M25.562 PAIN IN LEFT KNEE: ICD-10-CM

## 2018-02-08 DIAGNOSIS — R10.9 UNSPECIFIED ABDOMINAL PAIN: ICD-10-CM

## 2018-02-08 DIAGNOSIS — Z29.9 ENCOUNTER FOR PROPHYLACTIC MEASURES, UNSPECIFIED: ICD-10-CM

## 2018-02-08 DIAGNOSIS — M25.561 PAIN IN RIGHT KNEE: ICD-10-CM

## 2018-02-08 DIAGNOSIS — M32.9 SYSTEMIC LUPUS ERYTHEMATOSUS, UNSPECIFIED: ICD-10-CM

## 2018-02-08 DIAGNOSIS — I77.0 ARTERIOVENOUS FISTULA, ACQUIRED: Chronic | ICD-10-CM

## 2018-02-08 DIAGNOSIS — M25.552 PAIN IN LEFT HIP: ICD-10-CM

## 2018-02-08 DIAGNOSIS — R21 RASH AND OTHER NONSPECIFIC SKIN ERUPTION: ICD-10-CM

## 2018-02-08 DIAGNOSIS — N18.6 END STAGE RENAL DISEASE: ICD-10-CM

## 2018-02-08 DIAGNOSIS — I10 ESSENTIAL (PRIMARY) HYPERTENSION: ICD-10-CM

## 2018-02-08 DIAGNOSIS — Z00.8 ENCOUNTER FOR OTHER GENERAL EXAMINATION: ICD-10-CM

## 2018-02-08 DIAGNOSIS — M25.551 PAIN IN RIGHT HIP: ICD-10-CM

## 2018-02-08 LAB
ALBUMIN SERPL ELPH-MCNC: 3.7 G/DL — SIGNIFICANT CHANGE UP (ref 3.3–5)
ALP SERPL-CCNC: 77 U/L — SIGNIFICANT CHANGE UP (ref 40–120)
ALT FLD-CCNC: 9 U/L — SIGNIFICANT CHANGE UP (ref 4–33)
ANISOCYTOSIS BLD QL: SLIGHT — SIGNIFICANT CHANGE UP
APPEARANCE UR: CLEAR — SIGNIFICANT CHANGE UP
AST SERPL-CCNC: 19 U/L — SIGNIFICANT CHANGE UP (ref 4–32)
BASE EXCESS BLDV CALC-SCNC: 5.6 MMOL/L — SIGNIFICANT CHANGE UP
BASOPHILS # BLD AUTO: 0.01 K/UL — SIGNIFICANT CHANGE UP (ref 0–0.2)
BASOPHILS NFR BLD AUTO: 0.3 % — SIGNIFICANT CHANGE UP (ref 0–2)
BASOPHILS NFR SPEC: 0.9 % — SIGNIFICANT CHANGE UP (ref 0–2)
BILIRUB SERPL-MCNC: 0.3 MG/DL — SIGNIFICANT CHANGE UP (ref 0.2–1.2)
BILIRUB UR-MCNC: NEGATIVE — SIGNIFICANT CHANGE UP
BLOOD GAS VENOUS - CREATININE: 9.86 MG/DL — HIGH (ref 0.5–1.3)
BLOOD UR QL VISUAL: NEGATIVE — SIGNIFICANT CHANGE UP
BUN SERPL-MCNC: 48 MG/DL — HIGH (ref 7–23)
C3 SERPL-MCNC: 68.1 MG/DL — LOW (ref 90–180)
C4 SERPL-MCNC: 23.9 MG/DL — SIGNIFICANT CHANGE UP (ref 10–40)
CALCIUM SERPL-MCNC: 8.9 MG/DL — SIGNIFICANT CHANGE UP (ref 8.4–10.5)
CHLORIDE BLDV-SCNC: 99 MMOL/L — SIGNIFICANT CHANGE UP (ref 96–108)
CHLORIDE SERPL-SCNC: 95 MMOL/L — LOW (ref 98–107)
CO2 SERPL-SCNC: 27 MMOL/L — SIGNIFICANT CHANGE UP (ref 22–31)
COLOR SPEC: SIGNIFICANT CHANGE UP
CREAT SERPL-MCNC: 9.61 MG/DL — HIGH (ref 0.5–1.3)
EOSINOPHIL # BLD AUTO: 0.03 K/UL — SIGNIFICANT CHANGE UP (ref 0–0.5)
EOSINOPHIL NFR BLD AUTO: 0.9 % — SIGNIFICANT CHANGE UP (ref 0–6)
EOSINOPHIL NFR FLD: 0 % — SIGNIFICANT CHANGE UP (ref 0–6)
ERYTHROCYTE [SEDIMENTATION RATE] IN BLOOD: 40 MM/HR — HIGH (ref 4–25)
GAS PNL BLDV: 137 MMOL/L — SIGNIFICANT CHANGE UP (ref 136–146)
GIANT PLATELETS BLD QL SMEAR: PRESENT — SIGNIFICANT CHANGE UP
GLUCOSE BLDV-MCNC: 78 — SIGNIFICANT CHANGE UP (ref 70–99)
GLUCOSE SERPL-MCNC: 75 MG/DL — SIGNIFICANT CHANGE UP (ref 70–99)
GLUCOSE UR-MCNC: NEGATIVE — SIGNIFICANT CHANGE UP
HCO3 BLDV-SCNC: 29 MMOL/L — HIGH (ref 20–27)
HCT VFR BLD CALC: 29.9 % — LOW (ref 34.5–45)
HCT VFR BLDV CALC: 29 % — LOW (ref 34.5–45)
HGB BLD-MCNC: 9.2 G/DL — LOW (ref 11.5–15.5)
HGB BLDV-MCNC: 9.4 G/DL — LOW (ref 11.5–15.5)
HYPOCHROMIA BLD QL: SLIGHT — SIGNIFICANT CHANGE UP
IMM GRANULOCYTES # BLD AUTO: 0.03 # — SIGNIFICANT CHANGE UP
IMM GRANULOCYTES NFR BLD AUTO: 0.9 % — SIGNIFICANT CHANGE UP (ref 0–1.5)
KETONES UR-MCNC: NEGATIVE — SIGNIFICANT CHANGE UP
LACTATE BLDV-MCNC: 0.9 MMOL/L — SIGNIFICANT CHANGE UP (ref 0.5–2)
LEUKOCYTE ESTERASE UR-ACNC: NEGATIVE — SIGNIFICANT CHANGE UP
LIDOCAIN IGE QN: 49.1 U/L — SIGNIFICANT CHANGE UP (ref 7–60)
LYMPHOCYTES # BLD AUTO: 0.84 K/UL — LOW (ref 1–3.3)
LYMPHOCYTES # BLD AUTO: 25.1 % — SIGNIFICANT CHANGE UP (ref 13–44)
LYMPHOCYTES NFR SPEC AUTO: 20.7 % — SIGNIFICANT CHANGE UP (ref 13–44)
MACROCYTES BLD QL: SLIGHT — SIGNIFICANT CHANGE UP
MCHC RBC-ENTMCNC: 28 PG — SIGNIFICANT CHANGE UP (ref 27–34)
MCHC RBC-ENTMCNC: 30.8 % — LOW (ref 32–36)
MCV RBC AUTO: 91.2 FL — SIGNIFICANT CHANGE UP (ref 80–100)
MONOCYTES # BLD AUTO: 0.5 K/UL — SIGNIFICANT CHANGE UP (ref 0–0.9)
MONOCYTES NFR BLD AUTO: 15 % — HIGH (ref 2–14)
MONOCYTES NFR BLD: 9.9 % — HIGH (ref 2–9)
MUCOUS THREADS # UR AUTO: SIGNIFICANT CHANGE UP
NEUTROPHIL AB SER-ACNC: 68.5 % — SIGNIFICANT CHANGE UP (ref 43–77)
NEUTROPHILS # BLD AUTO: 1.93 K/UL — SIGNIFICANT CHANGE UP (ref 1.8–7.4)
NEUTROPHILS NFR BLD AUTO: 57.8 % — SIGNIFICANT CHANGE UP (ref 43–77)
NITRITE UR-MCNC: NEGATIVE — SIGNIFICANT CHANGE UP
NRBC # FLD: 0 — SIGNIFICANT CHANGE UP
OVALOCYTES BLD QL SMEAR: SLIGHT — SIGNIFICANT CHANGE UP
PCO2 BLDV: 46 MMHG — SIGNIFICANT CHANGE UP (ref 41–51)
PH BLDV: 7.43 PH — SIGNIFICANT CHANGE UP (ref 7.32–7.43)
PH UR: 8.5 — HIGH (ref 4.6–8)
PLATELET # BLD AUTO: 168 K/UL — SIGNIFICANT CHANGE UP (ref 150–400)
PLATELET COUNT - ESTIMATE: NORMAL — SIGNIFICANT CHANGE UP
PMV BLD: 9.6 FL — SIGNIFICANT CHANGE UP (ref 7–13)
PO2 BLDV: 37 MMHG — SIGNIFICANT CHANGE UP (ref 35–40)
POIKILOCYTOSIS BLD QL AUTO: SLIGHT — SIGNIFICANT CHANGE UP
POLYCHROMASIA BLD QL SMEAR: SLIGHT — SIGNIFICANT CHANGE UP
POTASSIUM BLDV-SCNC: 4.1 MMOL/L — SIGNIFICANT CHANGE UP (ref 3.4–4.5)
POTASSIUM SERPL-MCNC: 4.3 MMOL/L — SIGNIFICANT CHANGE UP (ref 3.5–5.3)
POTASSIUM SERPL-SCNC: 4.3 MMOL/L — SIGNIFICANT CHANGE UP (ref 3.5–5.3)
PROT SERPL-MCNC: 6.5 G/DL — SIGNIFICANT CHANGE UP (ref 6–8.3)
PROT UR-MCNC: 300 MG/DL — HIGH
RBC # BLD: 3.28 M/UL — LOW (ref 3.8–5.2)
RBC # FLD: 19.2 % — HIGH (ref 10.3–14.5)
RBC CASTS # UR COMP ASSIST: SIGNIFICANT CHANGE UP (ref 0–?)
REVIEW TO FOLLOW: YES — SIGNIFICANT CHANGE UP
SAO2 % BLDV: 62.2 % — SIGNIFICANT CHANGE UP (ref 60–85)
SCHISTOCYTES BLD QL AUTO: SLIGHT — SIGNIFICANT CHANGE UP
SODIUM SERPL-SCNC: 140 MMOL/L — SIGNIFICANT CHANGE UP (ref 135–145)
SP GR SPEC: 1.01 — SIGNIFICANT CHANGE UP (ref 1–1.04)
SQUAMOUS # UR AUTO: SIGNIFICANT CHANGE UP
UROBILINOGEN FLD QL: NORMAL MG/DL — SIGNIFICANT CHANGE UP
WBC # BLD: 3.34 K/UL — LOW (ref 3.8–10.5)
WBC # FLD AUTO: 3.34 K/UL — LOW (ref 3.8–10.5)
WBC UR QL: HIGH (ref 0–?)

## 2018-02-08 PROCEDURE — 99223 1ST HOSP IP/OBS HIGH 75: CPT | Mod: GC

## 2018-02-08 PROCEDURE — 73522 X-RAY EXAM HIPS BI 3-4 VIEWS: CPT | Mod: 26

## 2018-02-08 PROCEDURE — 76700 US EXAM ABDOM COMPLETE: CPT | Mod: 26

## 2018-02-08 PROCEDURE — 73564 X-RAY EXAM KNEE 4 OR MORE: CPT

## 2018-02-08 PROCEDURE — 73521 X-RAY EXAM HIPS BI 2 VIEWS: CPT

## 2018-02-08 PROCEDURE — 76700 US EXAM ABDOM COMPLETE: CPT

## 2018-02-08 PROCEDURE — 73565 X-RAY EXAM OF KNEES: CPT

## 2018-02-08 PROCEDURE — 73565 X-RAY EXAM OF KNEES: CPT | Mod: 26

## 2018-02-08 PROCEDURE — 73562 X-RAY EXAM OF KNEE 3: CPT | Mod: 26,59,RT

## 2018-02-08 PROCEDURE — 73562 X-RAY EXAM OF KNEE 3: CPT | Mod: 26,59,LT

## 2018-02-08 RX ORDER — NIFEDIPINE 30 MG
90 TABLET, EXTENDED RELEASE 24 HR ORAL ONCE
Qty: 0 | Refills: 0 | Status: COMPLETED | OUTPATIENT
Start: 2018-02-08 | End: 2018-02-08

## 2018-02-08 RX ORDER — PANTOPRAZOLE SODIUM 20 MG/1
40 TABLET, DELAYED RELEASE ORAL
Qty: 0 | Refills: 0 | Status: DISCONTINUED | OUTPATIENT
Start: 2018-02-08 | End: 2018-02-10

## 2018-02-08 RX ORDER — GABAPENTIN 400 MG/1
100 CAPSULE ORAL
Qty: 0 | Refills: 0 | Status: DISCONTINUED | OUTPATIENT
Start: 2018-02-08 | End: 2018-02-10

## 2018-02-08 RX ORDER — SENNA PLUS 8.6 MG/1
2 TABLET ORAL AT BEDTIME
Qty: 0 | Refills: 0 | Status: DISCONTINUED | OUTPATIENT
Start: 2018-02-08 | End: 2018-02-10

## 2018-02-08 RX ORDER — CINACALCET 30 MG/1
30 TABLET, FILM COATED ORAL DAILY
Qty: 0 | Refills: 0 | Status: DISCONTINUED | OUTPATIENT
Start: 2018-02-08 | End: 2018-02-10

## 2018-02-08 RX ORDER — DOCUSATE SODIUM 100 MG
100 CAPSULE ORAL
Qty: 0 | Refills: 0 | Status: DISCONTINUED | OUTPATIENT
Start: 2018-02-08 | End: 2018-02-10

## 2018-02-08 RX ORDER — LABETALOL HCL 100 MG
300 TABLET ORAL
Qty: 0 | Refills: 0 | Status: DISCONTINUED | OUTPATIENT
Start: 2018-02-08 | End: 2018-02-10

## 2018-02-08 RX ORDER — GABAPENTIN 400 MG/1
1 CAPSULE ORAL
Qty: 0 | Refills: 0 | COMMUNITY

## 2018-02-08 RX ORDER — CINACALCET 30 MG/1
1 TABLET, FILM COATED ORAL
Qty: 0 | Refills: 0 | COMMUNITY

## 2018-02-08 RX ORDER — FOLIC ACID 0.8 MG
1 TABLET ORAL DAILY
Qty: 0 | Refills: 0 | Status: DISCONTINUED | OUTPATIENT
Start: 2018-02-08 | End: 2018-02-10

## 2018-02-08 RX ORDER — HYDRALAZINE HCL 50 MG
100 TABLET ORAL
Qty: 0 | Refills: 0 | Status: DISCONTINUED | OUTPATIENT
Start: 2018-02-08 | End: 2018-02-10

## 2018-02-08 RX ORDER — MORPHINE SULFATE 50 MG/1
4 CAPSULE, EXTENDED RELEASE ORAL ONCE
Qty: 0 | Refills: 0 | Status: DISCONTINUED | OUTPATIENT
Start: 2018-02-08 | End: 2018-02-08

## 2018-02-08 RX ORDER — LABETALOL HCL 100 MG
200 TABLET ORAL ONCE
Qty: 0 | Refills: 0 | Status: COMPLETED | OUTPATIENT
Start: 2018-02-08 | End: 2018-02-08

## 2018-02-08 RX ORDER — POLYETHYLENE GLYCOL 3350 17 G/17G
17 POWDER, FOR SOLUTION ORAL DAILY
Qty: 0 | Refills: 0 | Status: DISCONTINUED | OUTPATIENT
Start: 2018-02-08 | End: 2018-02-10

## 2018-02-08 RX ORDER — HYDRALAZINE HCL 50 MG
100 TABLET ORAL ONCE
Qty: 0 | Refills: 0 | Status: COMPLETED | OUTPATIENT
Start: 2018-02-08 | End: 2018-02-08

## 2018-02-08 RX ORDER — ACETAMINOPHEN 500 MG
650 TABLET ORAL EVERY 6 HOURS
Qty: 0 | Refills: 0 | Status: DISCONTINUED | OUTPATIENT
Start: 2018-02-08 | End: 2018-02-10

## 2018-02-08 RX ORDER — NIFEDIPINE 30 MG
90 TABLET, EXTENDED RELEASE 24 HR ORAL DAILY
Qty: 0 | Refills: 0 | Status: DISCONTINUED | OUTPATIENT
Start: 2018-02-09 | End: 2018-02-10

## 2018-02-08 RX ORDER — HEPARIN SODIUM 5000 [USP'U]/ML
5000 INJECTION INTRAVENOUS; SUBCUTANEOUS EVERY 8 HOURS
Qty: 0 | Refills: 0 | Status: DISCONTINUED | OUTPATIENT
Start: 2018-02-08 | End: 2018-02-10

## 2018-02-08 RX ORDER — HYDROMORPHONE HYDROCHLORIDE 2 MG/ML
4 INJECTION INTRAMUSCULAR; INTRAVENOUS; SUBCUTANEOUS EVERY 4 HOURS
Qty: 0 | Refills: 0 | Status: DISCONTINUED | OUTPATIENT
Start: 2018-02-08 | End: 2018-02-10

## 2018-02-08 RX ORDER — FAMOTIDINE 10 MG/ML
20 INJECTION INTRAVENOUS DAILY
Qty: 0 | Refills: 0 | Status: DISCONTINUED | OUTPATIENT
Start: 2018-02-08 | End: 2018-02-10

## 2018-02-08 RX ORDER — FAMOTIDINE 10 MG/ML
1 INJECTION INTRAVENOUS
Qty: 0 | Refills: 0 | COMMUNITY

## 2018-02-08 RX ORDER — ONDANSETRON 8 MG/1
4 TABLET, FILM COATED ORAL ONCE
Qty: 0 | Refills: 0 | Status: COMPLETED | OUTPATIENT
Start: 2018-02-08 | End: 2018-02-08

## 2018-02-08 RX ADMIN — Medication 100 MILLIGRAM(S): at 22:18

## 2018-02-08 RX ADMIN — Medication 30 MILLIGRAM(S): at 17:45

## 2018-02-08 RX ADMIN — Medication 300 MILLIGRAM(S): at 22:18

## 2018-02-08 RX ADMIN — Medication 100 MILLIGRAM(S): at 16:20

## 2018-02-08 RX ADMIN — HYDROMORPHONE HYDROCHLORIDE 4 MILLIGRAM(S): 2 INJECTION INTRAMUSCULAR; INTRAVENOUS; SUBCUTANEOUS at 21:14

## 2018-02-08 RX ADMIN — Medication 200 MILLIGRAM(S): at 16:20

## 2018-02-08 RX ADMIN — MORPHINE SULFATE 4 MILLIGRAM(S): 50 CAPSULE, EXTENDED RELEASE ORAL at 16:20

## 2018-02-08 RX ADMIN — HYDROMORPHONE HYDROCHLORIDE 4 MILLIGRAM(S): 2 INJECTION INTRAMUSCULAR; INTRAVENOUS; SUBCUTANEOUS at 20:14

## 2018-02-08 RX ADMIN — ONDANSETRON 4 MILLIGRAM(S): 8 TABLET, FILM COATED ORAL at 16:20

## 2018-02-08 RX ADMIN — Medication 90 MILLIGRAM(S): at 16:20

## 2018-02-08 NOTE — H&P ADULT - LYMPHATIC
posterior cervical R/anterior cervical L/supraclavicular L/supraclavicular R/posterior cervical L/anterior cervical R

## 2018-02-08 NOTE — H&P ADULT - PROBLEM SELECTOR PLAN 5
-c/w home medications, likely elevated 2/2 missed HD as well  -likely for HD tomorrow  -continue to monitor BP; patient without CP, headache, SOB and BP improved after receiving home medications.

## 2018-02-08 NOTE — ED PROVIDER NOTE - PROGRESS NOTE DETAILS
Elizabeth Goldberger PGY-1: called pt's deshaun Leonardo to discuss case, waited on hold several times from her , who then came back and said was unavailable and will call back when can Elizabeth Goldberger PGY-1: spoke to pt's rheumatologist Jorden, who recommends dose of 30 iv methylpred. TBA

## 2018-02-08 NOTE — ED PROVIDER NOTE - SKIN RASH DESCRIPTION
PATCHY AREAS/FLAKY/diffuse purpuric, crusted, non-vesicular macular rash, most prominent on face (left > right) but also scattered lesions on back, arms, and legs; non-tender/MACULAR

## 2018-02-08 NOTE — ED PROVIDER NOTE - LEFT FACE
purpuric rash with overlying crust scattered throughout face (not malar), including eyelids, more diffuse on left than right

## 2018-02-08 NOTE — H&P ADULT - HISTORY OF PRESENT ILLNESS
Patient is a 27 year old woman with SLE, lupus nephritis class IV-V with ESRD on HD T/Th/Sat, hx recurrent c. diff who comes to [facility] ER complaining of rash that started three days ago. She reports oval shaped lesions that are tender to touch, with some blistering and peeling without any drainage. They are present on her arms, legs, abdomen, chest, back. She has never had a rash like this before. Additionally c/o rash on her face that started on eyelid accompanied by periorbital swelling she notices when she misses HD. Rash is present on ears, forehead, and cheeks as well. She primarily came to hospital for the facial rash. She also complains of knee, hip and shoulder pain without swelling that is 4/10 in severity and worse than her typical joint pain. Additionally complains of LUQ pain that is sharp, non-radiating, no alleviating or aggravating factors. 3 days ago she had 10/10 pain requiring positioning in fetal position. Pain now is intermittent, not associated with BM. Her last BM was this morning; soft regular, no bleeding or melena, no diarrhea, no constipation. Patient denies sick contacts, travel; she recently quit her job and has stayed home most of the time. No new medications, lotions, creams, pets, furniture, clothes, detergents. No bug bites or sun exposure. She did stop plaquenil January 18, 2018 due to retinal damage as suggested by her ophthalmologist and rheumatologist. On Jan 17 visit with Dr. Leonardo patient c/o night sweats which have continued; requiring change of shirt overnight. She was started on methylprednisolone 12 mg daily x 2 weeks to be decreased to 8 mg in the third week at that time. She has not decreased the dose yet and continues to take 12 mg daily. Endorses subjective fever. No chills, chest pain, SOB, flank pain, dysuria, hematuria, vomiting, diarrhea, nausea, melena, hematochezia. She has chronic headaches unchanged in quality or frequency. She states she had a similar type of abdominal pain over 1 year ago and was told she may have a vasculitis that causes the abdominal pain.     In ER: T 99, HR 78, /110, RR 17, SpO2 98% on RA. Patient received hydralazine 100 mg PO, labetalol 200 mg PO, methylprednisolone 30 mg IV x 1, morphine 4 mg IV x 1, nifedipine 90 mg ER x 1, zofran 4 mg x 1. 27 year old woman with SLE, lupus nephritis class IV-V with ESRD on HD T/Th/Sat, hx recurrent c. diff who comes to [facility] ER complaining of rash that started three days ago. She reports oval shaped lesions that are tender to touch, with some blistering and peeling without any drainage. They are present on her arms, legs, abdomen, chest, back. She has never had a rash like this before. Additionally c/o rash on her face that started on eyelid accompanied by periorbital swelling she notices when she misses HD. Rash is present on ears, forehead, and cheeks as well. She primarily came to hospital for the facial rash. She also complains of knee, hip and shoulder pain without swelling that is 4/10 in severity and worse than her typical joint pain. Additionally complains of LUQ pain that is sharp, non-radiating, no alleviating or aggravating factors. 3 days ago she had 10/10 pain requiring positioning in fetal position. Pain now is intermittent, not associated with BM. Her last BM was this morning; soft regular, no bleeding or melena, no diarrhea, no constipation. Patient denies sick contacts, travel; she recently quit her job and has stayed home most of the time. No new medications, lotions, creams, pets, furniture, clothes, detergents. No bug bites or sun exposure. She did stop plaquenil January 18, 2018 due to retinal damage as suggested by her ophthalmologist and rheumatologist. On Jan 17 visit with Dr. Leonardo patient c/o night sweats which have continued; requiring change of shirt overnight. She was started on methylprednisolone 12 mg daily x 2 weeks to be decreased to 8 mg in the third week at that time. She has not decreased the dose yet and continues to take 12 mg daily. Endorses subjective fever. No chills, chest pain, SOB, flank pain, dysuria, hematuria, vomiting, diarrhea, nausea, melena, hematochezia. She has chronic headaches unchanged in quality or frequency. She states she had a similar type of abdominal pain over 1 year ago and was told she may have a vasculitis that causes the abdominal pain.     In ER: T 99, HR 78, /110, RR 17, SpO2 98% on RA. Patient received hydralazine 100 mg PO, labetalol 200 mg PO, methylprednisolone 30 mg IV x 1, morphine 4 mg IV x 1, nifedipine 90 mg ER x 1, zofran 4 mg x 1. 27 year old woman with SLE, lupus nephritis class IV-V with ESRD on HD T/Th/Sat, hx recurrent c. diff, per [employer] SLE since 2008, manifestation included: malar, discoid rash, scaring alopecia, arthritis, microangiopathy, colitis. Pt  complaining of rash that started three days ago. She reports oval shaped lesions that are tender to touch, with some blistering and peeling without any drainage. They are present on her arms, legs, abdomen, chest, back. She has never had a rash like this before. Additionally c/o rash on her face that started on eyelid accompanied by periorbital swelling she notices when she misses HD. Rash is present on ears, forehead, and cheeks as well. She primarily came to hospital for the facial rash. She also complains of knee, hip and shoulder pain without swelling that is 4/10 in severity and worse than her typical joint pain. Additionally complains of LUQ pain that is sharp, non-radiating, no alleviating or aggravating factors. 3 days ago she had 10/10 pain requiring positioning in fetal position. Pain now is intermittent, not associated with BM. Her last BM was this morning; soft regular, no bleeding or melena, no diarrhea, no constipation. Patient denies sick contacts, travel; she recently quit her job and has stayed home most of the time. No new medications, lotions, creams, pets, furniture, clothes, detergents. No bug bites or sun exposure. She did stop plaquenil January 18, 2018 due to retinal damage as suggested by her ophthalmologist and rheumatologist. On Jan 17 visit with Dr. Leonardo patient c/o night sweats which have continued; requiring change of shirt overnight. She was started on methylprednisolone 12 mg daily x 2 weeks to be decreased to 8 mg in the third week at that time. She has not decreased the dose yet and continues to take 12 mg daily. Endorses subjective fever. No chills, chest pain, SOB, flank pain, dysuria, hematuria, vomiting, diarrhea, nausea, melena, hematochezia. She has chronic headaches unchanged in quality or frequency. She states she had a similar type of abdominal pain over 1 year ago and was told she may have a vasculitis that causes the abdominal pain.     In ER: T 99, HR 78, /110, RR 17, SpO2 98% on RA. Patient received hydralazine 100 mg PO, labetalol 200 mg PO, methylprednisolone 30 mg IV x 1, morphine 4 mg IV x 1, nifedipine 90 mg ER x 1, zofran 4 mg x 1.

## 2018-02-08 NOTE — ED ADULT NURSE NOTE - OBJECTIVE STATEMENT
Pt a&ox3 c/o lupus flare symptoms, rash, bodyaches, rash noted to face and trunk/arms. Pt denies sob breathing even and unlabored. Pt hypertensive, denies cp/discomfort, denies headache/dizziness. Abdomen soft, non-tender, non-distended. Skin is warm dry and intact. IVL placed, labs sent. Pt awaiting MD cerda, will continue to monitor.

## 2018-02-08 NOTE — H&P ADULT - PROBLEM SELECTOR PLAN 1
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

## 2018-02-08 NOTE — H&P ADULT - NSHPPHYSICALEXAM_GEN_ALL_CORE
Vital Signs Last 24 Hrs  T(C): 37.2 (08 Feb 2018 18:48), Max: 37.2 (08 Feb 2018 18:48)  T(F): 99 (08 Feb 2018 18:48), Max: 99 (08 Feb 2018 18:48)  HR: 78 (08 Feb 2018 18:48) (75 - 78)  BP: 165/110 (08 Feb 2018 18:48) (165/110 - 220/110)  BP(mean): --  RR: 17 (08 Feb 2018 18:48) (16 - 17)  SpO2: 98% (08 Feb 2018 18:48) (98% - 100%)    Constitutional: no acute distress, talkative patient  Eyes: clear conjunctiva  ENMT: moist oral mucosa  Respiratory: CTA b/l  Cardiovascular: S1, S2, RR  Gastrointestinal: soft, tender in LUQ, non-distended, normal to percussion, no masses palpated, no rebound tenderness, no guarding.   Genitourinary: no CVA tenderness  Extremities: no LE edema  Neurological: AAO x 4  Skin: discoid lesions on arms, legs, back, abdomen, chest. Non blanching lesions, one appears to be tense/bullous on RLE upper thigh, one on R. wrist with peeling no discharge  Lymph Nodes: no cervical lymphadenopathy  Psychiatric: normal affect. Vital Signs Last 24 Hrs  T(C): 37.2 (08 Feb 2018 18:48), Max: 37.2 (08 Feb 2018 18:48)  T(F): 99 (08 Feb 2018 18:48), Max: 99 (08 Feb 2018 18:48)  HR: 78 (08 Feb 2018 18:48) (75 - 78)  BP: 165/110 (08 Feb 2018 18:48) (165/110 - 220/110)  BP(mean): --  RR: 17 (08 Feb 2018 18:48) (16 - 17)  SpO2: 98% (08 Feb 2018 18:48) (98% - 100%)    Constitutional: no acute distress, talkative patient  Eyes: clear conjunctiva  ENMT: moist oral mucosa  Respiratory: CTA b/l  Cardiovascular: S1, S2, RR  Gastrointestinal: soft, tender in LUQ, non-distended, normal to percussion, no masses palpated, no rebound tenderness, no guarding.   Genitourinary: no CVA tenderness  Extremities: no LE edema  Neurological: AAO x 4  Skin: discoid erythematous lesions on arms, legs, back, abdomen, chest, size 2x2cm, Non blanching lesions, one appears to be tense/bullous on RLE upper thigh, one on R. wrist with peeling no discharge  Lymph Nodes: no cervical lymphadenopathy  Psychiatric: normal affect.

## 2018-02-08 NOTE — H&P ADULT - PROBLEM SELECTOR PLAN 3
-LUQ abdominal pain without concerning findings on abdominal US  -patient in no acute distress without N/V/D  -afebrile, HD stable  -dilaudid for severe pain, tylenol for mild-moderate pain  -c/w bowel regimen while receiving opiates. -LUQ abdominal pain without concerning findings on abdominal US  -patient in no acute distress without N/V/D  -afebrile, HD stable  -dilaudid for severe pain, tylenol for mild-moderate pain  -c/w bowel regimen while receiving opiates.  -lactate wnl

## 2018-02-08 NOTE — H&P ADULT - PROBLEM SELECTOR PLAN 4
-missed HD today; receives in evening  -no signs of fluid overload on physical exam, electrolytes acceptable  -call nephrology (Dr. Massey) in AM to set up inpatient HD

## 2018-02-08 NOTE — H&P ADULT - NSHPLABSRESULTS_GEN_ALL_CORE
9.2    3.34  )-----------( 168      ( 2018 14:23 )             29.9     02-08    140  |  95<L>  |  48<H>  ----------------------------<  75  4.3   |  27  |  9.61<H>    Ca    8.9      2018 14:23    TPro  6.5  /  Alb  3.7  /  TBili  0.3  /  DBili  x   /  AST  19  /  ALT  9   /  AlkPhos  77  02-08        CAPILLARY BLOOD GLUCOSE          Urinalysis Basic - ( 2018 17:29 )    Color: PLYEL / Appearance: CLEAR / S.011 / pH: 8.5  Gluc: NEGATIVE / Ketone: NEGATIVE  / Bili: NEGATIVE / Urobili: NORMAL mg/dL   Blood: NEGATIVE / Protein: 300 mg/dL / Nitrite: NEGATIVE   Leuk Esterase: NEGATIVE / RBC: 0-2 / WBC 5-10   Sq Epi: MOD / Non Sq Epi: x / Bacteria: x      C3 Complement, Serum (18 @ 18:00)    C3 Complement, Serum: 68.1 mg/dL  C4 Complement, Serum (18 @ 18:00)    C4 Complement, Serum: 23.9 mg/dL Sonography of the abdomen, 18:  Liver: Liver is enlarged measuring up to 18.6 cm in length. The liver is   normal in echogenicity. No focal hepatic lesion is seen. The hepatic   veins and portal veins are patent and demonstrate normal direction of   flow.  Bile ducts: Normal caliber. Common bile duct measures 3 mm.   Gallbladder: Within normal limits.      Pancreas: Visualized portions are within normal limits.  Spleen: 9.6 cm. There are small accessory spleens, largest measures 9 x 8   x 12 mm.  Right kidney: 9.5 cm. No hydronephrosis. The renal parenchyma is   echogenic consistent with chronic renal disease.  Left kidney: 9.7 cm.  No hydronephrosis. There are multiple renal cysts.   The largest is in the upper pole measuring 1.2 x 1.4 x 1.5 cm.  Ascites: There is questionable trace perihepatic fluid adjacent to the   gallbladder fundus.  Aorta and IVC: Visualized portions are within normal limits.  IMPRESSION:   Hepatomegaly. No focal hepatic lesion is seen.  Echogenic right kidney consistent with chronic renal disease. Left renal   cysts.  Questionable trace perihepatic ascites adjacent to the gallbladder fundus.    CAPILLARY BLOOD GLUCOSE          Urinalysis Basic - ( 2018 17:29 )    Color: PLYEL / Appearance: CLEAR / S.011 / pH: 8.5  Gluc: NEGATIVE / Ketone: NEGATIVE  / Bili: NEGATIVE / Urobili: NORMAL mg/dL   Blood: NEGATIVE / Protein: 300 mg/dL / Nitrite: NEGATIVE   Leuk Esterase: NEGATIVE / RBC: 0-2 / WBC 5-10   Sq Epi: MOD / Non Sq Epi: x / Bacteria: x      C3 Complement, Serum (18 @ 18:00)    C3 Complement, Serum: 68.1 mg/dL  C4 Complement, Serum (18 @ 18:00)    C4 Complement, Serum: 23.9 mg/dL

## 2018-02-08 NOTE — H&P ADULT - ASSESSMENT
27 year old woman with SLE, lupus nephritis class IV-V with ESRD on HD T/Th/Sat, hx recurrent c. diff admitted for possible lupus flare. 27 year old woman with SLE, lupus nephritis class IV-V with ESRD on HD T/Th/Sat, hx recurrent c. diff admitted for likely lupus flare with new skin manifestations

## 2018-02-08 NOTE — ED PROVIDER NOTE - OBJECTIVE STATEMENT
27f w hx SLE, ESRD 2/2 lupus nephritis on HD TTS, renal osteodystrophy, HTN, GERD, here today c/o 3 days epigastric abd pain and joint pains. Has pain in knees, hips, and shoulders, c/w her usual flares. Also had 1 episode fever 2 days ago w/o other sx that resolved. She came to ED today because of new onset non-pruritic, mildy painful rash that started yesterday- has on face, legs, and back. Is different than her usual lupus rash so became concerned. No n/v, no urinary complaints. Did not yet have HD today since gets at night. Also did not take any of her meds today as went for abd u/s (rheum had sent her to eval for hepatomeg/splenomeg). Normally takes methylpred 12 daily, temporarily off plaquenil due to questionable eye abnormalities

## 2018-02-08 NOTE — H&P ADULT - NSHPREVIEWOFSYSTEMS_GEN_ALL_CORE
General: +subjective fever  Ophthalmologic: no change in vision  ENMT: no sore throat  Respiratory and Thorax: no SOB, no cough  Cardiovascular: no CP  Gastrointestinal: +abdominal pain  Genitourinary: no dysuria  Musculoskeletal: no joint pain  Neurological: no HA  Psychiatric: no anxiety/depression  Hematology/Lymphatics: no abnormal bleeding  Endocrine: no changes in weight  Skin: rash

## 2018-02-08 NOTE — H&P ADULT - PROBLEM SELECTOR PLAN 2
-C3 decreased, f/u dsDNA  -s/p one dose methylprednisolone 30 mg IV  -f/u additional rheumatology recommendations -C3 decreased, f/u dsDNA to r/o lupus flare.   -s/p one dose methylprednisolone 30 mg IV  -f/u additional rheumatology recommendations -C3 decreased, f/u dsDNA to r/o lupus flare.   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

## 2018-02-08 NOTE — ED PROVIDER NOTE - MEDICAL DECISION MAKING DETAILS
27f w SLE, ESRD, HTN, here today for 3 days abd pain and joint pain and 1 day rash. Sx c/w likely lupus flare given diffuseness and similarity to previous. Rash may be atypical lupus rash (not photosensitive distribution) vs HD rash. Will check labs, hydrate, pain ctrl, d/w rheum, likely adm Chiraa att: 27f w SLE, ESRD, HTN, here today for 3 days abd pain and joint pain and 1 day rash. Sx c/w likely lupus flare given diffuseness and similarity to previous. Rash may be atypical lupus rash (not photosensitive distribution) vs HD rash. Will check labs, hydrate, pain ctrl, d/w rheum, likely adm

## 2018-02-08 NOTE — ED ADULT TRIAGE NOTE - CHIEF COMPLAINT QUOTE
pt c/o abdominal pain lupus flare, bodyaches and new rash, rash noted to face and trunk, pt reports this is not her usual lupus flare, pt on dialysis tu-th-sat, due today, hypertensive in triage.

## 2018-02-08 NOTE — H&P ADULT - NSHPSOCIALHISTORY_GEN_ALL_CORE
non smoker  social alcohol use (minimal as patient concerned about HD/SLE)  no drug use  not working non smoker  social alcohol use (minimal as patient concerned about HD/SLE)  no drug use  not employed

## 2018-02-09 DIAGNOSIS — D64.9 ANEMIA, UNSPECIFIED: ICD-10-CM

## 2018-02-09 DIAGNOSIS — E87.5 HYPERKALEMIA: ICD-10-CM

## 2018-02-09 DIAGNOSIS — I10 ESSENTIAL (PRIMARY) HYPERTENSION: ICD-10-CM

## 2018-02-09 LAB
BASOPHILS # BLD AUTO: 0.01 K/UL — SIGNIFICANT CHANGE UP (ref 0–0.2)
BASOPHILS NFR BLD AUTO: 0.4 % — SIGNIFICANT CHANGE UP (ref 0–2)
BUN SERPL-MCNC: 57 MG/DL — HIGH (ref 7–23)
CALCIUM SERPL-MCNC: 8.6 MG/DL — SIGNIFICANT CHANGE UP (ref 8.4–10.5)
CHLORIDE SERPL-SCNC: 95 MMOL/L — LOW (ref 98–107)
CO2 SERPL-SCNC: 25 MMOL/L — SIGNIFICANT CHANGE UP (ref 22–31)
CREAT SERPL-MCNC: 10.26 MG/DL — HIGH (ref 0.5–1.3)
DSDNA AB SER-ACNC: 60 IU/ML — HIGH
EOSINOPHIL # BLD AUTO: 0 K/UL — SIGNIFICANT CHANGE UP (ref 0–0.5)
EOSINOPHIL NFR BLD AUTO: 0 % — SIGNIFICANT CHANGE UP (ref 0–6)
GLUCOSE SERPL-MCNC: 85 MG/DL — SIGNIFICANT CHANGE UP (ref 70–99)
HCT VFR BLD CALC: 30.3 % — LOW (ref 34.5–45)
HGB BLD-MCNC: 9.8 G/DL — LOW (ref 11.5–15.5)
IMM GRANULOCYTES # BLD AUTO: 0.04 # — SIGNIFICANT CHANGE UP
IMM GRANULOCYTES NFR BLD AUTO: 1.4 % — SIGNIFICANT CHANGE UP (ref 0–1.5)
LYMPHOCYTES # BLD AUTO: 0.58 K/UL — LOW (ref 1–3.3)
LYMPHOCYTES # BLD AUTO: 20.5 % — SIGNIFICANT CHANGE UP (ref 13–44)
MAGNESIUM SERPL-MCNC: 2.3 MG/DL — SIGNIFICANT CHANGE UP (ref 1.6–2.6)
MCHC RBC-ENTMCNC: 29.7 PG — SIGNIFICANT CHANGE UP (ref 27–34)
MCHC RBC-ENTMCNC: 32.3 % — SIGNIFICANT CHANGE UP (ref 32–36)
MCV RBC AUTO: 91.8 FL — SIGNIFICANT CHANGE UP (ref 80–100)
MONOCYTES # BLD AUTO: 0.29 K/UL — SIGNIFICANT CHANGE UP (ref 0–0.9)
MONOCYTES NFR BLD AUTO: 10.2 % — SIGNIFICANT CHANGE UP (ref 2–14)
NEUTROPHILS # BLD AUTO: 1.91 K/UL — SIGNIFICANT CHANGE UP (ref 1.8–7.4)
NEUTROPHILS NFR BLD AUTO: 67.5 % — SIGNIFICANT CHANGE UP (ref 43–77)
NRBC # FLD: 0 — SIGNIFICANT CHANGE UP
PHOSPHATE SERPL-MCNC: 6.9 MG/DL — HIGH (ref 2.5–4.5)
PLATELET # BLD AUTO: 164 K/UL — SIGNIFICANT CHANGE UP (ref 150–400)
PMV BLD: 9.4 FL — SIGNIFICANT CHANGE UP (ref 7–13)
POTASSIUM SERPL-MCNC: 5.5 MMOL/L — HIGH (ref 3.5–5.3)
POTASSIUM SERPL-SCNC: 5.5 MMOL/L — HIGH (ref 3.5–5.3)
RBC # BLD: 3.3 M/UL — LOW (ref 3.8–5.2)
RBC # FLD: 19.1 % — HIGH (ref 10.3–14.5)
SODIUM SERPL-SCNC: 139 MMOL/L — SIGNIFICANT CHANGE UP (ref 135–145)
WBC # BLD: 2.83 K/UL — LOW (ref 3.8–10.5)
WBC # FLD AUTO: 2.83 K/UL — LOW (ref 3.8–10.5)

## 2018-02-09 PROCEDURE — 99223 1ST HOSP IP/OBS HIGH 75: CPT | Mod: GC

## 2018-02-09 PROCEDURE — 99233 SBSQ HOSP IP/OBS HIGH 50: CPT | Mod: GC

## 2018-02-09 PROCEDURE — 93010 ELECTROCARDIOGRAM REPORT: CPT | Mod: 76

## 2018-02-09 RX ORDER — INSULIN HUMAN 100 [IU]/ML
10 INJECTION, SOLUTION SUBCUTANEOUS ONCE
Qty: 0 | Refills: 0 | Status: DISCONTINUED | OUTPATIENT
Start: 2018-02-09 | End: 2018-02-09

## 2018-02-09 RX ORDER — DEXTROSE 50 % IN WATER 50 %
50 SYRINGE (ML) INTRAVENOUS ONCE
Qty: 0 | Refills: 0 | Status: COMPLETED | OUTPATIENT
Start: 2018-02-09 | End: 2018-02-09

## 2018-02-09 RX ORDER — INSULIN HUMAN 100 [IU]/ML
10 INJECTION, SOLUTION SUBCUTANEOUS ONCE
Qty: 0 | Refills: 0 | Status: COMPLETED | OUTPATIENT
Start: 2018-02-09 | End: 2018-02-09

## 2018-02-09 RX ORDER — ALBUTEROL 90 UG/1
10 AEROSOL, METERED ORAL ONCE
Qty: 0 | Refills: 0 | Status: COMPLETED | OUTPATIENT
Start: 2018-02-09 | End: 2018-02-09

## 2018-02-09 RX ORDER — ALBUTEROL 90 UG/1
2.5 AEROSOL, METERED ORAL ONCE
Qty: 0 | Refills: 0 | Status: DISCONTINUED | OUTPATIENT
Start: 2018-02-09 | End: 2018-02-09

## 2018-02-09 RX ADMIN — ALBUTEROL 5 MILLIGRAM(S): 90 AEROSOL, METERED ORAL at 10:47

## 2018-02-09 RX ADMIN — INSULIN HUMAN 10 UNIT(S): 100 INJECTION, SOLUTION SUBCUTANEOUS at 09:51

## 2018-02-09 RX ADMIN — POLYETHYLENE GLYCOL 3350 17 GRAM(S): 17 POWDER, FOR SOLUTION ORAL at 11:52

## 2018-02-09 RX ADMIN — HYDROMORPHONE HYDROCHLORIDE 4 MILLIGRAM(S): 2 INJECTION INTRAMUSCULAR; INTRAVENOUS; SUBCUTANEOUS at 03:04

## 2018-02-09 RX ADMIN — PANTOPRAZOLE SODIUM 40 MILLIGRAM(S): 20 TABLET, DELAYED RELEASE ORAL at 05:32

## 2018-02-09 RX ADMIN — Medication 300 MILLIGRAM(S): at 05:32

## 2018-02-09 RX ADMIN — FAMOTIDINE 20 MILLIGRAM(S): 10 INJECTION INTRAVENOUS at 11:52

## 2018-02-09 RX ADMIN — HYDROMORPHONE HYDROCHLORIDE 4 MILLIGRAM(S): 2 INJECTION INTRAMUSCULAR; INTRAVENOUS; SUBCUTANEOUS at 16:00

## 2018-02-09 RX ADMIN — Medication 100 MILLIGRAM(S): at 05:31

## 2018-02-09 RX ADMIN — Medication 100 MILLIGRAM(S): at 23:59

## 2018-02-09 RX ADMIN — Medication 90 MILLIGRAM(S): at 05:32

## 2018-02-09 RX ADMIN — HYDROMORPHONE HYDROCHLORIDE 4 MILLIGRAM(S): 2 INJECTION INTRAMUSCULAR; INTRAVENOUS; SUBCUTANEOUS at 04:04

## 2018-02-09 RX ADMIN — CINACALCET 30 MILLIGRAM(S): 30 TABLET, FILM COATED ORAL at 11:52

## 2018-02-09 RX ADMIN — Medication 1 MILLIGRAM(S): at 11:51

## 2018-02-09 RX ADMIN — HYDROMORPHONE HYDROCHLORIDE 4 MILLIGRAM(S): 2 INJECTION INTRAMUSCULAR; INTRAVENOUS; SUBCUTANEOUS at 16:44

## 2018-02-09 RX ADMIN — Medication 50 MILLILITER(S): at 09:50

## 2018-02-09 NOTE — PROGRESS NOTE ADULT - PROBLEM SELECTOR PLAN 6
-HSQ for DVT ppx  -Diet: Renal restrictions    Janay Hardy MD  Spectra [phone]  Pager [phone] -c/w home medications, likely elevated 2/2 missed HD as well  -will go for HD today pending nephro recs  -continue to monitor BP; patient without CP, headache, SOB and BP improved after receiving home medications.

## 2018-02-09 NOTE — CONSULT NOTE ADULT - PROBLEM SELECTOR RECOMMENDATION 2
BP in acceptable range. Monitor BP on current meds. Pt. to get HD today. Monitor BP. Low salt diet advised

## 2018-02-09 NOTE — PROGRESS NOTE ADULT - SUBJECTIVE AND OBJECTIVE BOX
PROVIDER CONTACT INFO:  MD CORNELIA Pace Pager: [phone]  [place] Pager: [phone]    CELISHAE  27y  Female      Patient is a 27y old  Female who presents with a chief complaint of Rash, Joint pain, Fever (2018 19:50)      INTERVAL HPI/OVERNIGHT EVENTS: No overnight events.     T(C): 36.6 (18 @ 05:05), Max: 37.2 (18 @ 18:48)  HR: 86 (18 @ 05:05) (75 - 86)  BP: 143/95 (18 @ 05:05) (143/95 - 220/110)  RR: 18 (18 @ 05:05) (16 - 18)  SpO2: 97% (18 @ 05:05) (97% - 100%)  Wt(kg): --Vital Signs Last 24 Hrs  T(C): 36.6 (2018 05:05), Max: 37.2 (2018 18:48)  T(F): 97.9 (2018 05:05), Max: 99 (2018 18:48)  HR: 86 (2018 05:05) (75 - 86)  BP: 143/95 (2018 05:05) (143/95 - 220/110)  BP(mean): --  RR: 18 (2018 05:05) (16 - 18)  SpO2: 97% (2018 05:05) (97% - 100%)    PHYSICAL EXAM:  GENERAL: NAD, well-groomed, well-developed  HEAD:  Atraumatic, Normocephalic  EYES: EOMI, PERRLA, conjunctiva and sclera clear  ENMT: No tonsillar erythema, exudates,; Moist mucous membranes. No lesions  NECK: Supple, No JVD, Normal thyroid  CHEST/LUNG: Clear to percussion bilaterally; No rales, rhonchi, wheezing, or rubs  HEART: Regular rate and rhythm; No murmurs, rubs, or gallops  ABDOMEN: Soft, Nontender, Nondistended; Bowel sounds present.  No hepatosplenomegaly  EXTREMITIES:  2+ Peripheral Pulses, No clubbing, cyanosis, or edema  LYMPH: No lymphadenopathy noted  SKIN: No rashes or lesions  PSYCH: Alert & Oriented x3    Consultant(s) Notes Reviewed:  [x ] YES  [ ] NO  Care Discussed with Consultants/Other Providers [ x] YES  [ ] NO    LABS:                        9.2    3.34  )-----------( 168      ( 2018 14:23 )             29.9     02-08    140  |  95<L>  |  48<H>  ----------------------------<  75  4.3   |  27  |  9.61<H>    Ca    8.9      2018 14:23    TPro  6.5  /  Alb  3.7  /  TBili  0.3  /  DBili  x   /  AST  19  /  ALT  9   /  AlkPhos  77  02-08      Urinalysis Basic - ( 2018 17:29 )    Color: PLYEL / Appearance: CLEAR / S.011 / pH: 8.5  Gluc: NEGATIVE / Ketone: NEGATIVE  / Bili: NEGATIVE / Urobili: NORMAL mg/dL   Blood: NEGATIVE / Protein: 300 mg/dL / Nitrite: NEGATIVE   Leuk Esterase: NEGATIVE / RBC: 0-2 / WBC 5-10   Sq Epi: MOD / Non Sq Epi: x / Bacteria: x      CAPILLARY BLOOD GLUCOSE            Urinalysis Basic - ( 2018 17:29 )    Color: PLYEL / Appearance: CLEAR / S.011 / pH: 8.5  Gluc: NEGATIVE / Ketone: NEGATIVE  / Bili: NEGATIVE / Urobili: NORMAL mg/dL   Blood: NEGATIVE / Protein: 300 mg/dL / Nitrite: NEGATIVE   Leuk Esterase: NEGATIVE / RBC: 0-2 / WBC 5-10   Sq Epi: MOD / Non Sq Epi: x / Bacteria: x PROVIDER CONTACT INFO:  MD CORNELIA Pace Pager: [phone]  [place] Pager: [phone]    SHAE ALATORRE  27y  Female      Patient is a 27y old  Female who presents with a chief complaint of Rash, Joint pain, Fever (2018 19:50)      INTERVAL HPI/OVERNIGHT EVENTS: No overnight events. This AM pt states her abd pain is improved, however she continues to have mild abd pain on left side. Denies F/C/N/V, CP/SOB, abd pain, dysuria, constipation/diarrhea.    T(C): 36.6 (18 @ 05:05), Max: 37.2 (18 @ 18:48)  HR: 86 (18 @ 05:05) (75 - 86)  BP: 143/95 (18 @ 05:05) (143/95 - 220/110)  RR: 18 (18 @ 05:05) (16 - 18)  SpO2: 97% (18 @ 05:05) (97% - 100%)  Wt(kg): --Vital Signs Last 24 Hrs  T(C): 36.6 (2018 05:05), Max: 37.2 (2018 18:48)  T(F): 97.9 (2018 05:05), Max: 99 (2018 18:48)  HR: 86 (2018 05:05) (75 - 86)  BP: 143/95 (2018 05:05) (143/95 - 220/110)  BP(mean): --  RR: 18 (2018 05:05) (16 - 18)  SpO2: 97% (2018 05:05) (97% - 100%)    PHYSICAL EXAM:  GENERAL: NAD, well-groomed, well-developed  HEAD:  Atraumatic, Normocephalic  EYES: EOMI, PERRLA, conjunctiva and sclera clear  ENMT: No tonsillar erythema, exudates,; Moist mucous membranes. No lesions  NECK: Supple, No JVD, Normal thyroid  CHEST/LUNG: Clear to percussion bilaterally; No rales, rhonchi, wheezing, or rubs  HEART: Regular rate and rhythm; No murmurs, rubs, or gallops  ABDOMEN: Soft, ttp in KENDY and LL quadrants, Nondistended; Bowel sounds present.   EXTREMITIES:  2+ Peripheral Pulses, No clubbing, cyanosis, or edema  LYMPH: No lymphadenopathy noted  SKIN: innumerable discoid erythematous lesions on face, back, bilat UE, bilat LE and upper chest   PSYCH: Alert & Oriented x3    Consultant(s) Notes Reviewed:  [x ] YES  [ ] NO  Care Discussed with Consultants/Other Providers [ x] YES  [ ] NO    LABS:                        9.2    3.34  )-----------( 168      ( 2018 14:23 )             29.9     02-08    140  |  95<L>  |  48<H>  ----------------------------<  75  4.3   |  27  |  9.61<H>    Ca    8.9      2018 14:23    TPro  6.5  /  Alb  3.7  /  TBili  0.3  /  DBili  x   /  AST  19  /  ALT  9   /  AlkPhos  77  02-08      Urinalysis Basic - ( 2018 17:29 )    Color: PLYEL / Appearance: CLEAR / S.011 / pH: 8.5  Gluc: NEGATIVE / Ketone: NEGATIVE  / Bili: NEGATIVE / Urobili: NORMAL mg/dL   Blood: NEGATIVE / Protein: 300 mg/dL / Nitrite: NEGATIVE   Leuk Esterase: NEGATIVE / RBC: 0-2 / WBC 5-10   Sq Epi: MOD / Non Sq Epi: x / Bacteria: x      CAPILLARY BLOOD GLUCOSE            Urinalysis Basic - ( 2018 17:29 )    Color: PLYEL / Appearance: CLEAR / S.011 / pH: 8.5  Gluc: NEGATIVE / Ketone: NEGATIVE  / Bili: NEGATIVE / Urobili: NORMAL mg/dL   Blood: NEGATIVE / Protein: 300 mg/dL / Nitrite: NEGATIVE   Leuk Esterase: NEGATIVE / RBC: 0-2 / WBC 5-10   Sq Epi: MOD / Non Sq Epi: x / Bacteria: x

## 2018-02-09 NOTE — PROGRESS NOTE ADULT - PROBLEM SELECTOR PLAN 4
-missed HD today; receives in evening  -no signs of fluid overload on physical exam, electrolytes acceptable  -will c/s nephrology to start HD as inpt Improving  -LUQ abdominal pain without concerning findings on abdominal US on admission  -patient in no acute distress without N/V/D  -afebrile, HD stable  -dilaudid for severe pain, tylenol for mild-moderate pain  -c/w bowel regimen while receiving opiates.  -lactate wnl

## 2018-02-09 NOTE — CONSULT NOTE ADULT - ASSESSMENT
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   Currently on medrol 12 mg daily   Presented with generalized skin lesions, arthralgias, fevers and abdominal pain ( currently resolved)  SLE with high disease activity clinically and serologically     Recommend:  - Prednisone 20 mg po daily for now  - Dermatology input regarding skin lesions and therapeutic options  - Follow up with Rheumatologist Dr Leonardo upon discharge    Marta Holt MD   Rheumatology Fellow  Pager: [phone]

## 2018-02-09 NOTE — PROGRESS NOTE ADULT - PROBLEM SELECTOR PLAN 3
-LUQ abdominal pain without concerning findings on abdominal US  -patient in no acute distress without N/V/D  -afebrile, HD stable  -dilaudid for severe pain, tylenol for mild-moderate pain  -c/w bowel regimen while receiving opiates.  -lactate wnl K+ is 5.5 this AM. Repeat EKG shows no changes, HD pending. Pt asymptomatic  - will give 10U insulin and 1 am D50 to temporize pending HD  - Daily BMP

## 2018-02-09 NOTE — CONSULT NOTE ADULT - SUBJECTIVE AND OBJECTIVE BOX
SHAE ALATORRE  [id number]    HISTORY OF PRESENT ILLNESS:        SLE history as follows:   SLE since , over years manifestation included: malar,discoid rash, scaring alopecia, arthritis, IV-V nephritis, microangiopathy, colitis.   # class IV-V nephritis on 2011 when she was treated with pulse steroids and CellCept . Lost to fu for a year and in 2013 - Rapidly progressing renal failure , started on HD. 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 Was treated with pulse steroids and PLEX and 1 g of Rituximab, received only one dose. Remained on HD since   # 2014 flare: microangiopathy/ colitis - improved with increased steroids   # Feb - 2015 flare with arthritis, alopecia, vasculitic rash, treated with increased prednisone.  # 2016 admission to [facility] - TMA with jejunal ulcerations, focal ischemia in jejunum and Holden negative hemolytic anemia.  # on HD since   - has a hx of recurrent c.Diff ( last in )  - Currently on medrol 8 mg daily , plaquenil on hold until gets a second opinion from optho       -Dr. Naun Massey (nephrology)  -Dr. Leonardo (rheumatology)      Review of Systems:  Gen:  No fevers/chills, weight loss  HEENT: No blurry vision, no difficulty swallowing  CVS: No chest pain/palpitations  Resp: No SOB/wheezing  GI: No N/V/C/D/abdominal pain  MSK:  Skin: No new rashes  Neuro: No headaches    MEDICATIONS  (STANDING):  cinacalcet 30 milliGRAM(s) Oral daily  docusate sodium 100 milliGRAM(s) Oral two times a day  famotidine    Tablet 20 milliGRAM(s) Oral daily  folic acid 1 milliGRAM(s) Oral daily  gabapentin 100 milliGRAM(s) Oral <User Schedule>  gabapentin 100 milliGRAM(s) Oral <User Schedule>  heparin  Injectable 5000 Unit(s) SubCutaneous every 8 hours  hydrALAZINE 100 milliGRAM(s) Oral two times a day  labetalol 300 milliGRAM(s) Oral two times a day  NIFEdipine XL 90 milliGRAM(s) Oral daily  pantoprazole    Tablet 40 milliGRAM(s) Oral before breakfast  polyethylene glycol 3350 17 Gram(s) Oral daily  senna 2 Tablet(s) Oral at bedtime  Solumderol 30 mg IV once 2/8         Allergies    No Known Allergies    Intolerances        PERTINENT MEDICATION HISTORY:    SOCIAL HISTORY:  OCCUPATION:  TRAVEL HISTORY:    FAMILY HISTORY:  Family history of hypertension (Aunt)      Vital Signs Last 24 Hrs  T(C): 36.6 (2018 12:20), Max: 37.2 (2018 18:48)  T(F): 97.9 (2018 12:20), Max: 99 (2018 18:48)  HR: 78 (2018 12:20) (78 - 86)  BP: 137/90 (2018 12:20) (136/96 - 220/110)  BP(mean): --  RR: 18 (2018 12:20) (16 - 18)  SpO2: 100% (2018 12:20) (97% - 100%)    Daily Height in cm: 167.64 (2018 20:45)    Daily Weight in k.5 (2018 05:05)    Physical Exam:  General: No apparent distress  HEENT: EOMI, MMM  CVS: +S1/S2, RRR, no murmurs/rubs/gallops  Resp: CTA b/l. No crackles/wheezing  GI: Soft, NT/ND +BS  MSK:  Shoulders: wnl  Elbows: wnl  Wrists: wnl  MCPs: wnl  PIPs: wnl  DIPs: wnl   Hips: wnl  Knees: wnl   Ankle: wnl  Neuro: AAOx3  Skin: no visible rashes    LABS:                        9.8    2.83  )-----------( 164      ( 2018 06:20 )  ESR 40              30.3     C3 68  C4 23   DsDNA        139  |  95<L>  |  57<H>  ----------------------------<  85  5.5<H>   |  25  |  10.26<H>    Ca    8.6      2018 06:20  Phos  6.9     02-09  Mg     2.3     02-    TPro  6.5  /  Alb  3.7  /  TBili  0.3  /  DBili  x   /  AST  19  /  ALT  9   /  AlkPhos  77  02-08      Urinalysis Basic - ( 2018 17:29 )    Color: PLYEL / Appearance: CLEAR / S.011 / pH: 8.5  Gluc: NEGATIVE / Ketone: NEGATIVE  / Bili: NEGATIVE / Urobili: NORMAL mg/dL   Blood: NEGATIVE / Protein: 300 mg/dL / Nitrite: NEGATIVE   Leuk Esterase: NEGATIVE / RBC: 0-2 / WBC 5-10   Sq Epi: MOD / Non Sq Epi: x / Bacteria: x      RADIOLOGY & ADDITIONAL STUDIES:  Xray Knee 4 Views, Bilateral (18 @ 12:38)   No fractures, dislocations, or joint effusions.    Currently apparent bilateral distal femoral metadiaphyseal region marrow   infarcts, left greater than right. No additional focal osseous lesions.   No stigmata of AVN along the bilateral knee joint margins.    Preserved joint spaces with smooth and intact articular surfaces. No   joint margin erosions or intra-articular or periarticular calcifications.    Unremarkable quadriceps and patellar tendon shadows. SHAE ALATORRE  [id number]    HISTORY OF PRESENT ILLNESS:        SLE history as follows:   SLE since , over years manifestation included: malar,discoid rash, scaring alopecia, arthritis, IV-V nephritis, microangiopathy, colitis.   # class IV-V nephritis on 2011 when she was treated with pulse steroids and CellCept . Lost to fu for a year and in 2013 - Rapidly progressing renal failure , started on HD. 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 Was treated with pulse steroids and PLEX and 1 g of Rituximab, received only one dose. Remained on HD since   # 2014 flare: microangiopathy/ colitis - improved with increased steroids   # Feb - 2015 flare with arthritis, alopecia, vasculitic rash, treated with increased prednisone.  # 2016 admission to [facility] - TMA with jejunal ulcerations, focal ischemia in jejunum and Holden negative hemolytic anemia.  # on HD since   - has a hx of recurrent c.Diff ( last in )  - Currently on medrol 8 mg daily , plaquenil on hold until gets a second opinion from optho       -Dr. Naun Massey (nephrology)  -Dr. Leonardo (rheumatology)      Review of Systems:  Gen:  No fevers/chills, weight loss  HEENT: No blurry vision, no difficulty swallowing  CVS: No chest pain/palpitations  Resp: No SOB/wheezing  GI: No N/V/C/D/abdominal pain  MSK:  Skin: No new rashes  Neuro: No headaches    MEDICATIONS  (STANDING):  cinacalcet 30 milliGRAM(s) Oral daily  docusate sodium 100 milliGRAM(s) Oral two times a day  famotidine    Tablet 20 milliGRAM(s) Oral daily  folic acid 1 milliGRAM(s) Oral daily  gabapentin 100 milliGRAM(s) Oral <User Schedule>  gabapentin 100 milliGRAM(s) Oral <User Schedule>  heparin  Injectable 5000 Unit(s) SubCutaneous every 8 hours  hydrALAZINE 100 milliGRAM(s) Oral two times a day  labetalol 300 milliGRAM(s) Oral two times a day  NIFEdipine XL 90 milliGRAM(s) Oral daily  pantoprazole    Tablet 40 milliGRAM(s) Oral before breakfast  polyethylene glycol 3350 17 Gram(s) Oral daily  senna 2 Tablet(s) Oral at bedtime  Solumderol 30 mg IV once 2/8         Allergies    No Known Allergies    Intolerances        PERTINENT MEDICATION HISTORY:    SOCIAL HISTORY:  OCCUPATION:  TRAVEL HISTORY:    FAMILY HISTORY:  Family history of hypertension (Aunt)      Vital Signs Last 24 Hrs  T(C): 36.6 (2018 12:20), Max: 37.2 (2018 18:48)  T(F): 97.9 (2018 12:20), Max: 99 (2018 18:48)  HR: 78 (2018 12:20) (78 - 86)  BP: 137/90 (2018 12:20) (136/96 - 220/110)  BP(mean): --  RR: 18 (2018 12:20) (16 - 18)  SpO2: 100% (2018 12:20) (97% - 100%)    Daily Height in cm: 167.64 (2018 20:45)    Daily Weight in k.5 (2018 05:05)    Physical Exam:  General: No apparent distress  HEENT: EOMI, MMM  CVS: +S1/S2, RRR, no murmurs/rubs/gallops  Resp: CTA b/l. No crackles/wheezing  GI: Soft, NT/ND +BS  MSK:  Shoulders: wnl  Elbows: wnl  Wrists: wnl  MCPs: wnl  PIPs: wnl  DIPs: wnl   Hips: wnl  Knees: wnl   Ankle: wnl  Neuro: AAOx3  Skin: no visible rashes    LABS:                        9.8    2.83  )-----------( 164      ( 2018 06:20 )  ESR 40              30.3     C3 68 (was 74 1-17-18)  C4 23   DsDNA = 60 (was 73 1-7-18)        139  |  95<L>  |  57<H>  ----------------------------<  85  5.5<H>   |  25  |  10.26<H>    Ca    8.6      2018 06:20  Phos  6.9     02-09  Mg     2.3     02-09    TPro  6.5  /  Alb  3.7  /  TBili  0.3  /  DBili  x   /  AST  19  /  ALT  9   /  AlkPhos  77  02-08      Urinalysis Basic - ( 2018 17:29 )    Color: PLYEL / Appearance: CLEAR / S.011 / pH: 8.5  Gluc: NEGATIVE / Ketone: NEGATIVE  / Bili: NEGATIVE / Urobili: NORMAL mg/dL   Blood: NEGATIVE / Protein: 300 mg/dL / Nitrite: NEGATIVE   Leuk Esterase: NEGATIVE / RBC: 0-2 / WBC 5-10   Sq Epi: MOD / Non Sq Epi: x / Bacteria: x      RADIOLOGY & ADDITIONAL STUDIES:  Xray Knee 4 Views, Bilateral (18 @ 12:38)   No fractures, dislocations, or joint effusions.    Currently apparent bilateral distal femoral metadiaphyseal region marrow   infarcts, left greater than right. No additional focal osseous lesions.   No stigmata of AVN along the bilateral knee joint margins.    Preserved joint spaces with smooth and intact articular surfaces. No   joint margin erosions or intra-articular or periarticular calcifications.    Unremarkable quadriceps and patellar tendon shadows. SHAE ALATORRE  [id number]    HISTORY OF PRESENT ILLNESS:    27 Sonja, with SLE ( history detailed below), LN with ESRD on HD, hx of recurrent c.Diff ( last in ), presented with acute worsening of generalized skin lesions.  The patient reports that the lesions developed over 12 hours, over the face, chest, back, arms, thighs and legs, non pruritic but painful. no previous similar lesions in the past, no new medications, no OTCs , no drug use  she also reports moderate left LQ abdominal discomfort that occurs with movement, no nausea/vomiting/diarrhea  endorses subjective fevers and sweats      SLE history as follows:   SLE since , over years manifestation included: malar,discoid rash, scaring alopecia, arthritis, IV-V nephritis, microangiopathy, colitis.   # class IV-V nephritis on 2011 when she was treated with pulse steroids and CellCept . Lost to fu for a year and in 2013 - Rapidly progressing renal failure , started on HD. 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 Was treated with pulse steroids and PLEX and 1 g of Rituximab, received only one dose. Remained on HD since   # 2014 flare: microangiopathy/ colitis - improved with increased steroids   # Feb - 2015 flare with arthritis, alopecia, vasculitic rash, treated with increased prednisone.  # 2016 admission to [facility] - TMA with jejunal ulcerations, focal ischemia in jejunum and Holden negative hemolytic anemia.  # on HD since     - Currently on medrol 8 mg daily , plaquenil on hold until gets a second opinion from optho       -Dr. Naun Massey (nephrology)  -Dr. Leonardo (rheumatology)      Review of Systems:  no worsening alopecia  no oral ulcers  mild arthralgias , no joint swelling  no chest pain or dyspnea      MEDICATIONS  (STANDING):  cinacalcet 30 milliGRAM(s) Oral daily  docusate sodium 100 milliGRAM(s) Oral two times a day  famotidine    Tablet 20 milliGRAM(s) Oral daily  folic acid 1 milliGRAM(s) Oral daily  gabapentin 100 milliGRAM(s) Oral <User Schedule>  gabapentin 100 milliGRAM(s) Oral <User Schedule>  heparin  Injectable 5000 Unit(s) SubCutaneous every 8 hours  hydrALAZINE 100 milliGRAM(s) Oral two times a day  labetalol 300 milliGRAM(s) Oral two times a day  NIFEdipine XL 90 milliGRAM(s) Oral daily  pantoprazole    Tablet 40 milliGRAM(s) Oral before breakfast  polyethylene glycol 3350 17 Gram(s) Oral daily  senna 2 Tablet(s) Oral at bedtime  Solumderol 30 mg IV once          Allergies    No Known Allergies            FAMILY HISTORY:  Family history of hypertension (Aunt)      Vital Signs Last 24 Hrs  T(C): 36.6 (2018 12:20), Max: 37.2 (2018 18:48)  T(F): 97.9 (2018 12:20), Max: 99 (2018 18:48)  HR: 78 (2018 12:20) (78 - 86)  BP: 137/90 (2018 12:20) (136/96 - 220/110)  BP(mean): --  RR: 18 (2018 12:20) (16 - 18)  SpO2: 100% (2018 12:20) (97% - 100%)    Daily Height in cm: 167.64 (2018 20:45)    Daily Weight in k.5 (2018 05:05)    Physical Exam:  General: No apparent distress  HEENT: EOMI, MMM  CVS: +S1/S2, RRR  Resp: CTA  GI: Soft, mild diffuse tenderness   MSK:  Shoulders: wnl  Elbows: wnl  Wrists: wnl  MCPs: wnl  PIPs: wnl  DIPs: wnl   Hips: wnl  Knees: wnl   Ankle: wnl  Neuro: AAOx3  Skin: generalized hyperpigmented erythematous patches over the face , ears, forehead upper arms, back, thighs and legs  some lesions are bullous      LABS:                        9.8    2.83  )-----------( 164      ( 2018 06:20 )  ESR 40              30.3     C3 68 (was 74 1-17-18)  C4 23   DsDNA = 60 (was 73 1-7-18)        139  |  95<L>  |  57<H>  ----------------------------<  85  5.5<H>   |  25  |  10.26<H>    Ca    8.6      2018 06:20  Phos  6.9       Mg     2.3         TPro  6.5  /  Alb  3.7  /  TBili  0.3  /  DBili  x   /  AST  19  /  ALT  9   /  AlkPhos  77        Urinalysis Basic - ( 2018 17:29 )    Color: PLYEL / Appearance: CLEAR / S.011 / pH: 8.5  Gluc: NEGATIVE / Ketone: NEGATIVE  / Bili: NEGATIVE / Urobili: NORMAL mg/dL   Blood: NEGATIVE / Protein: 300 mg/dL / Nitrite: NEGATIVE   Leuk Esterase: NEGATIVE / RBC: 0-2 / WBC 5-10   Sq Epi: MOD / Non Sq Epi: x / Bacteria: x      RADIOLOGY & ADDITIONAL STUDIES:  Xray Knee 4 Views, Bilateral (18 @ 12:38)   No fractures, dislocations, or joint effusions.    Currently apparent bilateral distal femoral metadiaphyseal region marrow   infarcts, left greater than right. No additional focal osseous lesions.   No stigmata of AVN along the bilateral knee joint margins.    Preserved joint spaces with smooth and intact articular surfaces. No   joint margin erosions or intra-articular or periarticular calcifications.    Unremarkable quadriceps and patellar tendon shadows.

## 2018-02-09 NOTE — PROGRESS NOTE ADULT - PROBLEM SELECTOR PLAN 5
-c/w home medications, likely elevated 2/2 missed HD as well  -?HD today  -continue to monitor BP; patient without CP, headache, SOB and BP improved after receiving home medications. -missed HD today; receives in evening  -no signs of fluid overload on physical exam, electrolytes acceptable  -will c/s nephrology to start HD as inpt

## 2018-02-09 NOTE — CONSULT NOTE ADULT - PROBLEM SELECTOR RECOMMENDATION 9
Pt. with ESRD on HD TIW (TTS). PT. last HD was at [facility] on 2/6. As per pt. tolerated well without complications and AVF was working well. Currently pt. clinically stable. Will arrange for HD today and HD tomorrow as per schedule. Monitor BMP and labs

## 2018-02-09 NOTE — PROGRESS NOTE ADULT - PROBLEM SELECTOR PLAN 1
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 However pt has had no changes to her medications recently

## 2018-02-10 ENCOUNTER — TRANSCRIPTION ENCOUNTER (OUTPATIENT)
Age: 28
End: 2018-02-10

## 2018-02-10 VITALS
HEART RATE: 80 BPM | RESPIRATION RATE: 18 BRPM | OXYGEN SATURATION: 97 % | DIASTOLIC BLOOD PRESSURE: 80 MMHG | TEMPERATURE: 98 F | SYSTOLIC BLOOD PRESSURE: 141 MMHG

## 2018-02-10 LAB
BUN SERPL-MCNC: 24 MG/DL — HIGH (ref 7–23)
CALCIUM SERPL-MCNC: 8.3 MG/DL — LOW (ref 8.4–10.5)
CHLORIDE SERPL-SCNC: 98 MMOL/L — SIGNIFICANT CHANGE UP (ref 98–107)
CO2 SERPL-SCNC: 28 MMOL/L — SIGNIFICANT CHANGE UP (ref 22–31)
CREAT SERPL-MCNC: 6.16 MG/DL — HIGH (ref 0.5–1.3)
GLUCOSE SERPL-MCNC: 80 MG/DL — SIGNIFICANT CHANGE UP (ref 70–99)
HCT VFR BLD CALC: 30.5 % — LOW (ref 34.5–45)
HGB BLD-MCNC: 9.6 G/DL — LOW (ref 11.5–15.5)
MAGNESIUM SERPL-MCNC: 2.1 MG/DL — SIGNIFICANT CHANGE UP (ref 1.6–2.6)
MCHC RBC-ENTMCNC: 28.7 PG — SIGNIFICANT CHANGE UP (ref 27–34)
MCHC RBC-ENTMCNC: 31.5 % — LOW (ref 32–36)
MCV RBC AUTO: 91.3 FL — SIGNIFICANT CHANGE UP (ref 80–100)
NRBC # FLD: 0 — SIGNIFICANT CHANGE UP
PHOSPHATE SERPL-MCNC: 4.7 MG/DL — HIGH (ref 2.5–4.5)
PLATELET # BLD AUTO: 184 K/UL — SIGNIFICANT CHANGE UP (ref 150–400)
PMV BLD: 10.5 FL — SIGNIFICANT CHANGE UP (ref 7–13)
POTASSIUM SERPL-MCNC: 4.5 MMOL/L — SIGNIFICANT CHANGE UP (ref 3.5–5.3)
POTASSIUM SERPL-SCNC: 4.5 MMOL/L — SIGNIFICANT CHANGE UP (ref 3.5–5.3)
RBC # BLD: 3.34 M/UL — LOW (ref 3.8–5.2)
RBC # FLD: 19.4 % — HIGH (ref 10.3–14.5)
SODIUM SERPL-SCNC: 141 MMOL/L — SIGNIFICANT CHANGE UP (ref 135–145)
WBC # BLD: 2.88 K/UL — LOW (ref 3.8–10.5)
WBC # FLD AUTO: 2.88 K/UL — LOW (ref 3.8–10.5)

## 2018-02-10 PROCEDURE — 99232 SBSQ HOSP IP/OBS MODERATE 35: CPT | Mod: GC

## 2018-02-10 PROCEDURE — 99239 HOSP IP/OBS DSCHRG MGMT >30: CPT

## 2018-02-10 RX ORDER — HYDROMORPHONE HYDROCHLORIDE 2 MG/ML
1 INJECTION INTRAMUSCULAR; INTRAVENOUS; SUBCUTANEOUS
Qty: 18 | Refills: 0 | OUTPATIENT
Start: 2018-02-10 | End: 2018-02-12

## 2018-02-10 RX ORDER — LABETALOL HCL 100 MG
1 TABLET ORAL
Qty: 90 | Refills: 0 | COMMUNITY

## 2018-02-10 RX ORDER — ERYTHROPOIETIN 10000 [IU]/ML
4000 INJECTION, SOLUTION INTRAVENOUS; SUBCUTANEOUS
Qty: 0 | Refills: 0 | Status: DISCONTINUED | OUTPATIENT
Start: 2018-02-10 | End: 2018-02-10

## 2018-02-10 RX ORDER — LABETALOL HCL 100 MG
1 TABLET ORAL
Qty: 0 | Refills: 0 | COMMUNITY
Start: 2018-02-10

## 2018-02-10 RX ADMIN — HYDROMORPHONE HYDROCHLORIDE 4 MILLIGRAM(S): 2 INJECTION INTRAMUSCULAR; INTRAVENOUS; SUBCUTANEOUS at 00:45

## 2018-02-10 RX ADMIN — Medication 90 MILLIGRAM(S): at 05:53

## 2018-02-10 RX ADMIN — HYDROMORPHONE HYDROCHLORIDE 4 MILLIGRAM(S): 2 INJECTION INTRAMUSCULAR; INTRAVENOUS; SUBCUTANEOUS at 00:00

## 2018-02-10 RX ADMIN — HYDROMORPHONE HYDROCHLORIDE 4 MILLIGRAM(S): 2 INJECTION INTRAMUSCULAR; INTRAVENOUS; SUBCUTANEOUS at 09:20

## 2018-02-10 RX ADMIN — SENNA PLUS 2 TABLET(S): 8.6 TABLET ORAL at 00:00

## 2018-02-10 RX ADMIN — HYDROMORPHONE HYDROCHLORIDE 4 MILLIGRAM(S): 2 INJECTION INTRAMUSCULAR; INTRAVENOUS; SUBCUTANEOUS at 09:50

## 2018-02-10 RX ADMIN — GABAPENTIN 100 MILLIGRAM(S): 400 CAPSULE ORAL at 05:53

## 2018-02-10 RX ADMIN — GABAPENTIN 100 MILLIGRAM(S): 400 CAPSULE ORAL at 00:01

## 2018-02-10 RX ADMIN — PANTOPRAZOLE SODIUM 40 MILLIGRAM(S): 20 TABLET, DELAYED RELEASE ORAL at 05:53

## 2018-02-10 RX ADMIN — Medication 100 MILLIGRAM(S): at 05:52

## 2018-02-10 RX ADMIN — Medication 300 MILLIGRAM(S): at 05:53

## 2018-02-10 RX ADMIN — Medication 20 MILLIGRAM(S): at 05:52

## 2018-02-10 RX ADMIN — Medication 300 MILLIGRAM(S): at 00:00

## 2018-02-10 NOTE — DISCHARGE NOTE ADULT - PLAN OF CARE
Improvement in Symptoms In the hospital you were found to have symptoms concerning for a lupus flare. You were given an increased dose of prednisone to treat this flare. Continue taking this increased dose and follow-up with your primary rheumatologist within one week of discharge. You are also to follow-up with dermatology next week. The [facility] division of dermatology is aware of your recent hospitalization and will be in contact with you regarding an appointment. The number for the dermatology office is [phone]. Return to the hospital if you experience a worsening of your symptoms, including fevers, chills, nausea, vomiting. Continue receiving dialysis on your Tuesday/Thursday/Saturday schedule. Continue taking home blood pressure medication.

## 2018-02-10 NOTE — PROGRESS NOTE ADULT - PROBLEM SELECTOR PLAN 2
BP in acceptable range. Monitor BP on current meds. Pt. to get HD today. Monitor BP. Low salt diet advised.
-C3 decreased, dsDNA 60, c/w lupus flare  - c/w steroids per rheum recs
-C3 decreased, f/u dsDNA to r/o lupus flare.   -s/p one dose methylprednisolone 30 mg IV  -f/u additional rheumatology recommendations

## 2018-02-10 NOTE — PROGRESS NOTE ADULT - PROBLEM SELECTOR PLAN 1
Pt. with ESRD on HD TIW (TTS). Pt. last HD was on 2/9 due to missed HD session. Pt. tolerated well without complications and AVF was working well. Currently pt. clinically stable. Will arrange for HD today as per schedule. Monitor BMP and labs.

## 2018-02-10 NOTE — DISCHARGE NOTE ADULT - CARE PLAN
Principal Discharge DX:	Lupus (systemic lupus erythematosus)  Goal:	Improvement in Symptoms  Assessment and plan of treatment:	In the hospital you were found to have symptoms concerning for a lupus flare. You were given an increased dose of prednisone to treat this flare. Continue taking this increased dose and follow-up with your primary rheumatologist within one week of discharge. You are also to follow-up with dermatology next week. The [facility] division of dermatology is aware of your recent hospitalization and will be in contact with you regarding an appointment. The number for the dermatology office is [phone]. Return to the hospital if you experience a worsening of your symptoms, including fevers, chills, nausea, vomiting.  Secondary Diagnosis:	ESRD (end stage renal disease) on dialysis  Assessment and plan of treatment:	Continue receiving dialysis on your Tuesday/Thursday/Saturday schedule.  Secondary Diagnosis:	HTN (hypertension)  Assessment and plan of treatment:	Continue taking home blood pressure medication.

## 2018-02-10 NOTE — PROGRESS NOTE ADULT - ASSESSMENT
27 year old woman with SLE, lupus nephritis class IV-V with ESRD on HD T/Th/Sat, hx recurrent c. diff admitted for likely lupus flare with new skin manifestations
27 year old woman with SLE, lupus nephritis class IV-V with ESRD on HD T/Th/Sat, hx recurrent c. diff admitted for likely lupus flare with new skin manifestations
27 year old female with a PMHx of HTN, SLE, LN ESRD on HD TTS admitted for lupus flare

## 2018-02-10 NOTE — DISCHARGE NOTE ADULT - HOSPITAL COURSE
27 year old woman with SLE, lupus nephritis class IV-V with ESRD on HD T/Th/Sat, hx recurrent c. diff, per [employer] SLE since 2008, manifestation included: malar, discoid rash, scaring alopecia, arthritis, microangiopathy, colitis. Pt  complaining of rash that started three days ago. She reports oval shaped lesions that are tender to touch, with some blistering and peeling without any drainage. They are present on her arms, legs, abdomen, chest, back. She has never had a rash like this before. Additionally c/o rash on her face that started on eyelid accompanied by periorbital swelling she notices when she misses HD. Rash is present on ears, forehead, and cheeks as well. She primarily came to hospital for the facial rash. Additionally complains of LUQ pain that is sharp, non-radiating, no alleviating or aggravating factors. No new medications, lotions, creams, pets, furniture, clothes, detergents. No bug bites or sun exposure. She did stop plaquenil January 18, 2018 due to retinal damage as suggested by her ophthalmologist and rheumatologist.  Endorsed subjective fever. No chills, chest pain, SOB, flank pain, dysuria, hematuria, vomiting, diarrhea, nausea, melena, hematochezia.     In ER: T 99, HR 78, /110, RR 17, SpO2 98% on RA. Patient received hydralazine 100 mg PO, labetalol 200 mg PO, methylprednisolone 30 mg IV x 1, morphine 4 mg IV x 1, nifedipine 90 mg ER x 1, zofran 4 mg x 1. Abdominal ultrasound showed no acute pathology. She was admitted to the general medical floor for further management of her lupus flare concerning for discoid lupus. The morning after admission her potassium was elevated to 5.5. She was given albuterol, D50, and 10U of insulin and nephro was consulted for hemodialysis. Rheumatology also evaluated the patient and determined they were unsure if this was a true discoid lupus and recommended derm eval. She was continued on 20mg prednisone QD. Pt stated she would prefer to leave rather than wait for dermatology evaluation on Monday. Dermatology was notified and made aware of the patient. Rheumatology was consulted and recommended that she stay on the 20mg dose of prednisone and have close follow-up with derm and rheum the week following discharge. She was stable for discharge with the appropriate outpatient follow-up. 27 year old woman with SLE, lupus nephritis class IV-V with ESRD on HD T/Th/Sat, hx recurrent c. diff, per [employer] SLE since 2008, manifestation included: malar, discoid rash, scaring alopecia, arthritis, microangiopathy, colitis. Pt  complaining of rash that started three days ago. She reports oval shaped lesions that are tender to touch, with some blistering and peeling without any drainage. They are present on her arms, legs, abdomen, chest, back. She has never had a rash like this before. Additionally c/o rash on her face that started on eyelid accompanied by periorbital swelling she notices when she misses HD. Rash is present on ears, forehead, and cheeks as well. She primarily came to hospital for the facial rash. Additionally complains of LUQ pain that is sharp, non-radiating, no alleviating or aggravating factors. No new medications, lotions, creams, pets, furniture, clothes, detergents. No bug bites or sun exposure. She did stop plaquenil January 18, 2018 due to retinal damage as suggested by her ophthalmologist and rheumatologist.  Endorsed subjective fever. No chills, chest pain, SOB, flank pain, dysuria, hematuria, vomiting, diarrhea, nausea, melena, hematochezia.     In ER: T 99, HR 78, /110, RR 17, SpO2 98% on RA. Patient received hydralazine 100 mg PO, labetalol 200 mg PO, methylprednisolone 30 mg IV x 1, morphine 4 mg IV x 1, nifedipine 90 mg ER x 1, zofran 4 mg x 1. Abdominal ultrasound showed no acute pathology. She was admitted to the general medical floor for further management of her lupus flare concerning for discoid lupus. The morning after admission her potassium was elevated to 5.5. She was given albuterol, D50, and 10U of insulin and nephro was consulted for hemodialysis. Rheumatology also evaluated the patient and determined they were unsure if this was a true discoid lupus and recommended derm eval. She was continued on 20mg prednisone QD. Pt stated she would prefer to leave rather than wait for dermatology evaluation on Monday. Dermatology was notified and made aware of the patient. Rheumatology was consulted and recommended that she stay on the 20mg dose of prednisone and have close follow-up with derm and rheum the week following discharge. Patient found a dermatologist who has appointments available over the weekend and she preferred to see him. She also stated she could get dialysis at her outpatient dialysis center at 7:30 per her usual schedule. She was stable for discharge with the appropriate outpatient follow-up and dialysis later in the evening.

## 2018-02-10 NOTE — DISCHARGE NOTE ADULT - ADDITIONAL INSTRUCTIONS
- You are to follow-up with Dermatology and Rheumatology as an outpatient.  - Continue taking prednisone 20mg QD until evaluation by your rheumatologist. - You are to follow-up with Dermatology and Rheumatology as an outpatient.  - Continue taking prednisone 20mg QD until evaluation by your rheumatologist.  - Continue dialysis on your regular schedule.

## 2018-02-10 NOTE — DISCHARGE NOTE ADULT - MEDICATION SUMMARY - MEDICATIONS TO TAKE
I will START or STAY ON the medications listed below when I get home from the hospital:    predniSONE 20 mg oral tablet  -- 1 tab(s) by mouth once a day  -- Indication: For Lupus (systemic lupus erythematosus)    HYDROmorphone 4 mg oral tablet  -- 1 tab(s) by mouth every 4 hours, As needed, Severe Pain (7 - 10) MDD:3 tablets  -- Indication: For Pain    gabapentin 100 mg oral capsule  -- 1 capsule daily on non-dialysis days and 1 capsule twice a day on dialysis days  -- Indication: For Neuropathic pain    labetalol 300 mg oral tablet  -- 1 tab(s) by mouth 2 times a day  -- Indication: For HTN (hypertension)    Sensipar 30 mg oral tablet  -- 1 tab(s) by mouth once a day  -- Indication: For High Calcium    NIFEdipine 90 mg oral tablet, extended release  -- 1 tab(s) by mouth once a day  -- Indication: For HTN (hypertension)    famotidine 20 mg oral tablet  -- 1 tab(s) by mouth once a day  -- Indication: For GERD    omeprazole 40 mg oral delayed release capsule  -- 1 cap(s) by mouth once a day  -- Indication: For GERD    hydrALAZINE 100 mg oral tablet  -- 1 tab(s) by mouth 2 times a day  -- Indication: For HTN (hypertension)    folic acid 1 mg oral tablet  -- 1 tab(s) by mouth once a day  -- Indication: For Health Maintenance

## 2018-02-10 NOTE — DISCHARGE NOTE ADULT - CARE PROVIDER_API CALL
Louisa Leonardo), Internal Medicine; Rheumatology  [address] [address]  Phone: [phone]  Fax: [phone]

## 2018-02-10 NOTE — PROGRESS NOTE ADULT - SUBJECTIVE AND OBJECTIVE BOX
[facility] DIVISION OF KIDNEY DISEASES AND HYPERTENSION -- FOLLOW UP NOTE  --------------------------------------------------------------------------------  HPI: 27 year old female with a PMHx of HTN, SLE,LN ESRD on HD TTS admitted for lupus flare. Pt. gets HD at [facility]. Pt. last had HD on 2/6, tolerated well without complications and AVF was working well. Currently pt. denies SOB, CP , N/V or edema.           PAST HISTORY  --------------------------------------------------------------------------------  No significant changes to PMH, PSH, FHx, SHx, unless otherwise noted    ALLERGIES & MEDICATIONS  --------------------------------------------------------------------------------  Allergies    No Known Allergies    Intolerances      Standing Inpatient Medications  cinacalcet 30 milliGRAM(s) Oral daily  docusate sodium 100 milliGRAM(s) Oral two times a day  famotidine    Tablet 20 milliGRAM(s) Oral daily  folic acid 1 milliGRAM(s) Oral daily  gabapentin 100 milliGRAM(s) Oral <User Schedule>  gabapentin 100 milliGRAM(s) Oral <User Schedule>  heparin  Injectable 5000 Unit(s) SubCutaneous every 8 hours  hydrALAZINE 100 milliGRAM(s) Oral two times a day  labetalol 300 milliGRAM(s) Oral two times a day  NIFEdipine XL 90 milliGRAM(s) Oral daily  pantoprazole    Tablet 40 milliGRAM(s) Oral before breakfast  polyethylene glycol 3350 17 Gram(s) Oral daily  predniSONE   Tablet 20 milliGRAM(s) Oral daily  senna 2 Tablet(s) Oral at bedtime    PRN Inpatient Medications  acetaminophen   Tablet. 650 milliGRAM(s) Oral every 6 hours PRN  HYDROmorphone   Tablet 4 milliGRAM(s) Oral every 4 hours PRN      REVIEW OF SYSTEMS  --------------------------------------------------------------------------------  Gen: No fevers  Head/Eyes/Ears/Mouth: No headache  Respiratory: No dyspnea  CV: No chest pain  GI: No abdominal pain  : No  dysuria  MSK: No edema  Skin: +rash         All other systems were reviewed and are negative, except as noted.  VITALS/PHYSICAL EXAM  --------------------------------------------------------------------------------  T(C): 37.2 (02-10-18 @ 05:21), Max: 37.2 (02-10-18 @ 05:21)  HR: 83 (02-10-18 @ 05:21) (75 - 94)  BP: 140/99 (02-10-18 @ 05:21) (136/96 - 152/95)  RR: 18 (02-10-18 @ 05:21) (16 - 18)  SpO2: 96% (02-10-18 @ 05:21) (96% - 100%)  Wt(kg): --  Height (cm): 167.64 (02-08-18 @ 20:45)  Weight (kg): 62.5 (02-08-18 @ 20:45)  BMI (kg/m2): 22.2 (02-08-18 @ 20:45)  BSA (m2): 1.71 (02-08-18 @ 20:45)      02-09-18 @ 07:01  -  02-10-18 @ 07:00  --------------------------------------------------------  IN: 400 mL / OUT: 2400 mL / NET: -2000 mL      Physical Exam:  	Gen: NAD,               HEENT:  supple neck  	Pulm: CTA B/L.  	CV: RRR,   	Abd:  soft,  	LE: Warm, no edema  	Psych: Normal affect and mood  	Skin: Warm, +rash arms/legs  	Vascular access: RUE AVF+ thrill/bruit     LABS/STUDIES  --------------------------------------------------------------------------------              9.6    2.88  >-----------<  184      [02-10-18 @ 05:30]              30.5     141  |  98  |  24  ----------------------------<  80      [02-10-18 @ 05:30]  4.5   |  28  |  6.16        Ca     8.3     [02-10-18 @ 05:30]      Mg     2.1     [02-10-18 @ 05:30]      Phos  4.7     [02-10-18 @ 05:30]    TPro  6.5  /  Alb  3.7  /  TBili  0.3  /  DBili  x   /  AST  19  /  ALT  9   /  AlkPhos  77  [02-08-18 @ 14:23]          Creatinine Trend:  SCr 6.16 [02-10 @ 05:30]  SCr 10.26 [02-09 @ 06:20]  SCr 9.61 [02-08 @ 14:23]    Urinalysis - [02-08-18 @ 17:29]      Color PLYEL / Appearance CLEAR / SG 1.011 / pH 8.5      Gluc NEGATIVE / Ketone NEGATIVE  / Bili NEGATIVE / Urobili NORMAL       Blood NEGATIVE / Protein 300 / Leuk Est NEGATIVE / Nitrite NEGATIVE      RBC 0-2 / WBC 5-10 / Hyaline  / Gran  / Sq Epi MOD / Non Sq Epi  / Bacteria         dsDNA 60      [02-08-18 @ 18:00]  C3 Complement 68.1      [02-08-18 @ 18:00]  C4 Complement 23.9      [02-08-18 @ 18:00]

## 2018-02-10 NOTE — PROGRESS NOTE ADULT - PROBLEM SELECTOR PLAN 1
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 However, rheumatology unsure if presentation is discoid, would like derm input  - prednisone 20mg PO per rheum recs  - f/u derm

## 2018-02-10 NOTE — PROGRESS NOTE ADULT - SUBJECTIVE AND OBJECTIVE BOX
PROVIDER CONTACT INFO:  MD CORNELIA Pace Pager: [phone]  [place] Pager: [phone]    CELI LORETTASAMEERTATIANA  27y  Female      Patient is a 27y old  Female who presents with a chief complaint of Rash, Joint pain, Fever (2018 19:50)      INTERVAL HPI/OVERNIGHT EVENTS: No overnight events. This AM pt states    T(C): 37.2 (02-10-18 @ 05:21), Max: 37.2 (02-10-18 @ 05:21)  HR: 83 (02-10-18 @ 05:21) (75 - 94)  BP: 140/99 (02-10-18 @ 05:21) (136/96 - 152/95)  RR: 18 (02-10-18 @ 05:21) (16 - 18)  SpO2: 96% (02-10-18 @ 05:21) (96% - 100%)  Wt(kg): --Vital Signs Last 24 Hrs  T(C): 37.2 (10 Feb 2018 05:21), Max: 37.2 (10 Feb 2018 05:21)  T(F): 98.9 (10 Feb 2018 05:21), Max: 98.9 (10 Feb 2018 05:21)  HR: 83 (10 Feb 2018 05:21) (75 - 94)  BP: 140/99 (10 Feb 2018 05:21) (136/96 - 152/95)  BP(mean): --  RR: 18 (10 Feb 2018 05:21) (16 - 18)  SpO2: 96% (10 Feb 2018 05:21) (96% - 100%)    PHYSICAL EXAM:  GENERAL: NAD, well-groomed, well-developed  HEAD:  Atraumatic, Normocephalic  EYES: EOMI, PERRLA, conjunctiva and sclera clear  ENMT: No tonsillar erythema, exudates,; Moist mucous membranes. No lesions  NECK: Supple, No JVD, Normal thyroid  CHEST/LUNG: Clear to percussion bilaterally; No rales, rhonchi, wheezing, or rubs  HEART: Regular rate and rhythm; No murmurs, rubs, or gallops  ABDOMEN: Soft, Nontender, Nondistended; Bowel sounds present.  No hepatosplenomegaly  EXTREMITIES:  2+ Peripheral Pulses, No clubbing, cyanosis, or edema  LYMPH: No lymphadenopathy noted  SKIN: innumerable erythematous lesions on body that spare the abdomen, some are bullae, others maculopapular.  PSYCH: Alert & Oriented x3  NERVOUS SYSTEM:  ; Motor Strength 5/5 B/L upper and lower extremities.  Sensory intact    Consultant(s) Notes Reviewed:  [x ] YES  [ ] NO  Care Discussed with Consultants/Other Providers [ x] YES  [ ] NO    LABS:                        9.6    2.88  )-----------( 184      ( 10 Feb 2018 05:30 )             30.5     02-10    141  |  98  |  24<H>  ----------------------------<  80  4.5   |  28  |  6.16<H>    Ca    8.3<L>      10 Feb 2018 05:30  Phos  4.7     02-10  Mg     2.1     02-10    TPro  6.5  /  Alb  3.7  /  TBili  0.3  /  DBili  x   /  AST  19  /  ALT  9   /  AlkPhos  77  02-08      Urinalysis Basic - ( 2018 17:29 )    Color: PLYEL / Appearance: CLEAR / S.011 / pH: 8.5  Gluc: NEGATIVE / Ketone: NEGATIVE  / Bili: NEGATIVE / Urobili: NORMAL mg/dL   Blood: NEGATIVE / Protein: 300 mg/dL / Nitrite: NEGATIVE   Leuk Esterase: NEGATIVE / RBC: 0-2 / WBC 5-10   Sq Epi: MOD / Non Sq Epi: x / Bacteria: x      CAPILLARY BLOOD GLUCOSE            Urinalysis Basic - ( 2018 17:29 )    Color: PLYEL / Appearance: CLEAR / S.011 / pH: 8.5  Gluc: NEGATIVE / Ketone: NEGATIVE  / Bili: NEGATIVE / Urobili: NORMAL mg/dL   Blood: NEGATIVE / Protein: 300 mg/dL / Nitrite: NEGATIVE   Leuk Esterase: NEGATIVE / RBC: 0-2 / WBC 5-10   Sq Epi: MOD / Non Sq Epi: x / Bacteria: x

## 2018-02-10 NOTE — DISCHARGE NOTE ADULT - MEDICATION SUMMARY - MEDICATIONS TO STOP TAKING
I will STOP taking the medications listed below when I get home from the hospital:    methylPREDNISolone  -- take 12 mG by mouth daily

## 2018-02-10 NOTE — PROGRESS NOTE ADULT - PROBLEM SELECTOR PLAN 7
-HSQ for DVT ppx  -Diet: Renal restrictions    Janay Hardy MD  Spectra [phone]  Pager [phone]
-HSQ for DVT ppx  -Diet: Renal restrictions    Janay Hardy MD  Spectra [phone]  Pager [phone]

## 2018-02-10 NOTE — PROGRESS NOTE ADULT - PROBLEM SELECTOR PLAN 5
-nephro consulted, will go for HD on their recs  -electrolytes and fluid status currently acceptable

## 2018-02-10 NOTE — DISCHARGE NOTE ADULT - PATIENT PORTAL LINK FT
You can access the [facility] Patient Portal, offered by [facility], by registering with the following website: [email or web address]

## 2018-02-10 NOTE — PROGRESS NOTE ADULT - ATTENDING COMMENTS
Feeling improved.  Lupus flare, ESRD  1.  ESRD--HD TIW  2.  Hypertension--optimizing EDW, meds  3.  Anemia--on VINCE.  Trend to goal
27F SLE with lupus nephritis, ESRD/HD with new skin lesions   --f/u rheum recs for treatment of possibel lupus flare, new discoid lupus  --HD per nephrology

## 2018-02-10 NOTE — PROGRESS NOTE ADULT - PROBLEM SELECTOR PLAN 4
Improving  -LUQ abdominal pain without concerning findings on abdominal US on admission  -patient in no acute distress without N/V/D  -afebrile, HD stable  -dilaudid for severe pain, tylenol for mild-moderate pain  -c/w bowel regimen while receiving opiates.  -lactate wnl

## 2018-02-13 ENCOUNTER — APPOINTMENT (OUTPATIENT)
Dept: RHEUMATOLOGY | Facility: CLINIC | Age: 28
End: 2018-02-13

## 2018-02-14 ENCOUNTER — EMERGENCY (EMERGENCY)
Facility: HOSPITAL | Age: 28
LOS: 1 days | Discharge: HOME | End: 2018-02-14
Attending: EMERGENCY MEDICINE | Admitting: EMERGENCY MEDICINE
Payer: MEDICAID

## 2018-02-14 ENCOUNTER — APPOINTMENT (OUTPATIENT)
Dept: DERMATOLOGY | Facility: CLINIC | Age: 28
End: 2018-02-14
Payer: MEDICAID

## 2018-02-14 VITALS
HEART RATE: 73 BPM | TEMPERATURE: 98 F | RESPIRATION RATE: 18 BRPM | DIASTOLIC BLOOD PRESSURE: 125 MMHG | SYSTOLIC BLOOD PRESSURE: 205 MMHG | OXYGEN SATURATION: 100 %

## 2018-02-14 VITALS
SYSTOLIC BLOOD PRESSURE: 159 MMHG | HEART RATE: 75 BPM | DIASTOLIC BLOOD PRESSURE: 96 MMHG | OXYGEN SATURATION: 97 % | TEMPERATURE: 98 F | RESPIRATION RATE: 17 BRPM

## 2018-02-14 VITALS — SYSTOLIC BLOOD PRESSURE: 214 MMHG | DIASTOLIC BLOOD PRESSURE: 134 MMHG

## 2018-02-14 DIAGNOSIS — N18.6 END STAGE RENAL DISEASE: ICD-10-CM

## 2018-02-14 DIAGNOSIS — I12.0 HYPERTENSIVE CHRONIC KIDNEY DISEASE WITH STAGE 5 CHRONIC KIDNEY DISEASE OR END STAGE RENAL DISEASE: ICD-10-CM

## 2018-02-14 DIAGNOSIS — Z79.891 LONG TERM (CURRENT) USE OF OPIATE ANALGESIC: ICD-10-CM

## 2018-02-14 DIAGNOSIS — Z79.899 OTHER LONG TERM (CURRENT) DRUG THERAPY: ICD-10-CM

## 2018-02-14 DIAGNOSIS — I10 ESSENTIAL (PRIMARY) HYPERTENSION: ICD-10-CM

## 2018-02-14 DIAGNOSIS — I77.0 ARTERIOVENOUS FISTULA, ACQUIRED: Chronic | ICD-10-CM

## 2018-02-14 DIAGNOSIS — Z99.2 DEPENDENCE ON RENAL DIALYSIS: ICD-10-CM

## 2018-02-14 LAB
ALBUMIN SERPL ELPH-MCNC: 3.6 G/DL — SIGNIFICANT CHANGE UP (ref 3.4–5)
ALP SERPL-CCNC: 80 U/L — SIGNIFICANT CHANGE UP (ref 40–120)
ALT FLD-CCNC: 9 U/L — LOW (ref 12–42)
ANION GAP SERPL CALC-SCNC: 9 MMOL/L — SIGNIFICANT CHANGE UP (ref 9–16)
AST SERPL-CCNC: 24 U/L — SIGNIFICANT CHANGE UP (ref 15–37)
BASOPHILS NFR BLD AUTO: 0.5 % — SIGNIFICANT CHANGE UP (ref 0–2)
BILIRUB SERPL-MCNC: 0.5 MG/DL — SIGNIFICANT CHANGE UP (ref 0.2–1.2)
BUN SERPL-MCNC: 21 MG/DL — SIGNIFICANT CHANGE UP (ref 7–23)
CALCIUM SERPL-MCNC: 9.6 MG/DL — SIGNIFICANT CHANGE UP (ref 8.5–10.5)
CHLORIDE SERPL-SCNC: 101 MMOL/L — SIGNIFICANT CHANGE UP (ref 96–108)
CO2 SERPL-SCNC: 31 MMOL/L — SIGNIFICANT CHANGE UP (ref 22–31)
CREAT SERPL-MCNC: 6.79 MG/DL — HIGH (ref 0.5–1.3)
EOSINOPHIL NFR BLD AUTO: 2.1 % — SIGNIFICANT CHANGE UP (ref 0–6)
GLUCOSE SERPL-MCNC: 84 MG/DL — SIGNIFICANT CHANGE UP (ref 70–99)
HCT VFR BLD CALC: 32.7 % — LOW (ref 34.5–45)
HGB BLD-MCNC: 10.1 G/DL — LOW (ref 11.5–15.5)
IMM GRANULOCYTES NFR BLD AUTO: 2.1 % — HIGH (ref 0–1.5)
LYMPHOCYTES # BLD AUTO: 15.7 % — SIGNIFICANT CHANGE UP (ref 13–44)
MAGNESIUM SERPL-MCNC: 2.2 MG/DL — SIGNIFICANT CHANGE UP (ref 1.6–2.6)
MCHC RBC-ENTMCNC: 28.7 PG — SIGNIFICANT CHANGE UP (ref 27–34)
MCHC RBC-ENTMCNC: 30.9 G/DL — LOW (ref 32–36)
MCV RBC AUTO: 92.9 FL — SIGNIFICANT CHANGE UP (ref 80–100)
MONOCYTES NFR BLD AUTO: 10.9 % — SIGNIFICANT CHANGE UP (ref 2–14)
NEUTROPHILS NFR BLD AUTO: 68.7 % — SIGNIFICANT CHANGE UP (ref 43–77)
PLATELET # BLD AUTO: 185 K/UL — SIGNIFICANT CHANGE UP (ref 150–400)
POTASSIUM SERPL-MCNC: 4.1 MMOL/L — SIGNIFICANT CHANGE UP (ref 3.5–5.3)
POTASSIUM SERPL-SCNC: 4.1 MMOL/L — SIGNIFICANT CHANGE UP (ref 3.5–5.3)
PROT SERPL-MCNC: 7.4 G/DL — SIGNIFICANT CHANGE UP (ref 6.4–8.2)
RBC # BLD: 3.52 M/UL — LOW (ref 3.8–5.2)
RBC # FLD: 18.8 % — HIGH (ref 10.3–16.9)
SODIUM SERPL-SCNC: 141 MMOL/L — SIGNIFICANT CHANGE UP (ref 132–145)
WBC # BLD: 4.2 K/UL — SIGNIFICANT CHANGE UP (ref 3.8–10.5)
WBC # FLD AUTO: 4.2 K/UL — SIGNIFICANT CHANGE UP (ref 3.8–10.5)

## 2018-02-14 PROCEDURE — 99284 EMERGENCY DEPT VISIT MOD MDM: CPT

## 2018-02-14 PROCEDURE — 99203 OFFICE O/P NEW LOW 30 MIN: CPT

## 2018-02-14 RX ORDER — LABETALOL HCL 100 MG
10 TABLET ORAL ONCE
Qty: 0 | Refills: 0 | Status: COMPLETED | OUTPATIENT
Start: 2018-02-14 | End: 2018-02-14

## 2018-02-14 RX ORDER — ONDANSETRON 8 MG/1
8 TABLET, FILM COATED ORAL ONCE
Qty: 0 | Refills: 0 | Status: COMPLETED | OUTPATIENT
Start: 2018-02-14 | End: 2018-02-14

## 2018-02-14 RX ORDER — HYDRALAZINE HCL 50 MG
100 TABLET ORAL ONCE
Qty: 0 | Refills: 0 | Status: COMPLETED | OUTPATIENT
Start: 2018-02-14 | End: 2018-02-14

## 2018-02-14 RX ORDER — ACETAMINOPHEN 500 MG
975 TABLET ORAL ONCE
Qty: 0 | Refills: 0 | Status: COMPLETED | OUTPATIENT
Start: 2018-02-14 | End: 2018-02-14

## 2018-02-14 RX ORDER — SODIUM CHLORIDE 9 MG/ML
500 INJECTION, SOLUTION INTRAVENOUS
Qty: 0 | Refills: 0 | Status: COMPLETED | OUTPATIENT
Start: 2018-02-14 | End: 2018-02-14

## 2018-02-14 RX ORDER — LABETALOL HCL 100 MG
300 TABLET ORAL ONCE
Qty: 0 | Refills: 0 | Status: COMPLETED | OUTPATIENT
Start: 2018-02-14 | End: 2018-02-14

## 2018-02-14 RX ORDER — NIFEDIPINE 30 MG
90 TABLET, EXTENDED RELEASE 24 HR ORAL ONCE
Qty: 0 | Refills: 0 | Status: COMPLETED | OUTPATIENT
Start: 2018-02-14 | End: 2018-02-14

## 2018-02-14 RX ADMIN — Medication 300 MILLIGRAM(S): at 16:30

## 2018-02-14 RX ADMIN — Medication 90 MILLIGRAM(S): at 16:30

## 2018-02-14 RX ADMIN — ONDANSETRON 8 MILLIGRAM(S): 8 TABLET, FILM COATED ORAL at 16:10

## 2018-02-14 RX ADMIN — Medication 975 MILLIGRAM(S): at 16:30

## 2018-02-14 RX ADMIN — SODIUM CHLORIDE 500 MILLILITER(S): 9 INJECTION, SOLUTION INTRAVENOUS at 16:25

## 2018-02-14 RX ADMIN — Medication 100 MILLIGRAM(S): at 16:30

## 2018-02-14 RX ADMIN — Medication 10 MILLIGRAM(S): at 16:10

## 2018-02-14 NOTE — ED ADULT TRIAGE NOTE - CHIEF COMPLAINT QUOTE
Patient to ED - sent from dermatologist for HTN.  Patient ESRD and Lupus - did not take Hypertensive medication today.  205/125 in triage

## 2018-02-14 NOTE — ED PROVIDER NOTE - MEDICAL DECISION MAKING DETAILS
Patient presenting with HTN and nausea- controlled with IVF, Zofan IV Labetalol and PO regimen. Labs at baseline. Feels better. Will d/c.

## 2018-02-14 NOTE — ED PROVIDER NOTE - CARE PLAN
Principal Discharge DX:	Hypertension, unspecified type  Secondary Diagnosis:	ESRD (end stage renal disease) on dialysis

## 2018-02-14 NOTE — ED PROVIDER NOTE - OBJECTIVE STATEMENT
Patient sent from [facility] for HTN and mild HA. Had nausea all and wasn't able to take BP meds. No other sx. She has ESRD on HD.

## 2018-02-17 ENCOUNTER — MOBILE ON CALL (OUTPATIENT)
Age: 28
End: 2018-02-17

## 2018-02-20 ENCOUNTER — APPOINTMENT (OUTPATIENT)
Dept: RHEUMATOLOGY | Facility: CLINIC | Age: 28
End: 2018-02-20
Payer: MEDICAID

## 2018-02-20 VITALS
HEIGHT: 66 IN | SYSTOLIC BLOOD PRESSURE: 141 MMHG | WEIGHT: 136 LBS | TEMPERATURE: 98 F | HEART RATE: 90 BPM | OXYGEN SATURATION: 98 % | DIASTOLIC BLOOD PRESSURE: 82 MMHG | BODY MASS INDEX: 21.86 KG/M2

## 2018-02-20 PROCEDURE — 99215 OFFICE O/P EST HI 40 MIN: CPT

## 2018-03-08 ENCOUNTER — APPOINTMENT (OUTPATIENT)
Dept: DERMATOLOGY | Facility: CLINIC | Age: 28
End: 2018-03-08

## 2018-03-09 ENCOUNTER — APPOINTMENT (OUTPATIENT)
Dept: RHEUMATOLOGY | Facility: CLINIC | Age: 28
End: 2018-03-09

## 2018-03-13 ENCOUNTER — APPOINTMENT (OUTPATIENT)
Dept: CV DIAGNOSITCS | Facility: HOSPITAL | Age: 28
End: 2018-03-13

## 2018-03-14 ENCOUNTER — APPOINTMENT (OUTPATIENT)
Dept: CARDIOLOGY | Facility: HOSPITAL | Age: 28
End: 2018-03-14

## 2018-03-14 ENCOUNTER — OUTPATIENT (OUTPATIENT)
Dept: OUTPATIENT SERVICES | Facility: HOSPITAL | Age: 28
LOS: 1 days | End: 2018-03-14

## 2018-03-14 ENCOUNTER — NON-APPOINTMENT (OUTPATIENT)
Age: 28
End: 2018-03-14

## 2018-03-14 VITALS
OXYGEN SATURATION: 97 % | RESPIRATION RATE: 16 BRPM | HEART RATE: 88 BPM | DIASTOLIC BLOOD PRESSURE: 94 MMHG | SYSTOLIC BLOOD PRESSURE: 149 MMHG | BODY MASS INDEX: 21.47 KG/M2 | WEIGHT: 133 LBS

## 2018-03-14 DIAGNOSIS — I77.0 ARTERIOVENOUS FISTULA, ACQUIRED: Chronic | ICD-10-CM

## 2018-03-16 DIAGNOSIS — I15.0 RENOVASCULAR HYPERTENSION: ICD-10-CM

## 2018-03-21 ENCOUNTER — RX RENEWAL (OUTPATIENT)
Age: 28
End: 2018-03-21

## 2018-03-27 ENCOUNTER — APPOINTMENT (OUTPATIENT)
Dept: RHEUMATOLOGY | Facility: CLINIC | Age: 28
End: 2018-03-27
Payer: MEDICAID

## 2018-03-27 VITALS
OXYGEN SATURATION: 98 % | TEMPERATURE: 97.5 F | WEIGHT: 136 LBS | HEART RATE: 88 BPM | DIASTOLIC BLOOD PRESSURE: 78 MMHG | HEIGHT: 66 IN | BODY MASS INDEX: 21.86 KG/M2 | SYSTOLIC BLOOD PRESSURE: 115 MMHG

## 2018-03-27 PROCEDURE — 99214 OFFICE O/P EST MOD 30 MIN: CPT

## 2018-04-01 LAB
ALBUMIN SERPL ELPH-MCNC: 4.1 G/DL
ALP BLD-CCNC: 74 U/L
ALT SERPL-CCNC: 7 U/L
ANION GAP SERPL CALC-SCNC: 19 MMOL/L
APPEARANCE: ABNORMAL
AST SERPL-CCNC: 12 U/L
BACTERIA: ABNORMAL
BASOPHILS # BLD AUTO: 0.01 K/UL
BASOPHILS NFR BLD AUTO: 0.3 %
BILIRUB SERPL-MCNC: 0.4 MG/DL
BILIRUBIN URINE: NEGATIVE
BLOOD URINE: NEGATIVE
BUN SERPL-MCNC: 35 MG/DL
C3 SERPL-MCNC: 69 MG/DL
C4 SERPL-MCNC: 23 MG/DL
CALCIUM SERPL-MCNC: 9.4 MG/DL
CHLORIDE SERPL-SCNC: 95 MMOL/L
CO2 SERPL-SCNC: 27 MMOL/L
COLOR: YELLOW
CREAT SERPL-MCNC: 9.36 MG/DL
DSDNA AB SER-ACNC: 55 IU/ML
EOSINOPHIL # BLD AUTO: 0.03 K/UL
EOSINOPHIL NFR BLD AUTO: 0.8 %
GLUCOSE QUALITATIVE U: NEGATIVE MG/DL
GLUCOSE SERPL-MCNC: 93 MG/DL
HCT VFR BLD CALC: 37.1 %
HGB BLD-MCNC: 11 G/DL
HYALINE CASTS: 0 /LPF
IMM GRANULOCYTES NFR BLD AUTO: 1.1 %
KETONES URINE: NEGATIVE
LEUKOCYTE ESTERASE URINE: NEGATIVE
LYMPHOCYTES # BLD AUTO: 0.92 K/UL
LYMPHOCYTES NFR BLD AUTO: 25.6 %
MAN DIFF?: NORMAL
MCHC RBC-ENTMCNC: 28.2 PG
MCHC RBC-ENTMCNC: 29.6 GM/DL
MCV RBC AUTO: 95.1 FL
MICROSCOPIC-UA: NORMAL
MONOCYTES # BLD AUTO: 0.33 K/UL
MONOCYTES NFR BLD AUTO: 9.2 %
NEUTROPHILS # BLD AUTO: 2.26 K/UL
NEUTROPHILS NFR BLD AUTO: 63 %
NITRITE URINE: NEGATIVE
PH URINE: >8.5
PLATELET # BLD AUTO: 259 K/UL
POTASSIUM SERPL-SCNC: 5 MMOL/L
PROT SERPL-MCNC: 6.9 G/DL
PROTEIN URINE: 300 MG/DL
RBC # BLD: 3.9 M/UL
RBC # FLD: 18.6 %
RED BLOOD CELLS URINE: 1 /HPF
SODIUM SERPL-SCNC: 141 MMOL/L
SPECIFIC GRAVITY URINE: 1.01
SQUAMOUS EPITHELIAL CELLS: 25 /HPF
UROBILINOGEN URINE: NEGATIVE MG/DL
WBC # FLD AUTO: 3.59 K/UL
WHITE BLOOD CELLS URINE: 5 /HPF

## 2018-04-06 ENCOUNTER — RX RENEWAL (OUTPATIENT)
Age: 28
End: 2018-04-06

## 2018-04-09 ENCOUNTER — RX RENEWAL (OUTPATIENT)
Age: 28
End: 2018-04-09

## 2018-04-18 ENCOUNTER — APPOINTMENT (OUTPATIENT)
Dept: CARDIOLOGY | Facility: HOSPITAL | Age: 28
End: 2018-04-18

## 2018-04-20 ENCOUNTER — APPOINTMENT (OUTPATIENT)
Dept: OPHTHALMOLOGY | Facility: CLINIC | Age: 28
End: 2018-04-20

## 2018-04-27 ENCOUNTER — APPOINTMENT (OUTPATIENT)
Dept: RHEUMATOLOGY | Facility: CLINIC | Age: 28
End: 2018-04-27

## 2018-04-28 ENCOUNTER — RX RENEWAL (OUTPATIENT)
Age: 28
End: 2018-04-28

## 2018-05-21 ENCOUNTER — RX RENEWAL (OUTPATIENT)
Age: 28
End: 2018-05-21

## 2018-05-30 ENCOUNTER — APPOINTMENT (OUTPATIENT)
Dept: RHEUMATOLOGY | Facility: CLINIC | Age: 28
End: 2018-05-30

## 2018-06-18 ENCOUNTER — APPOINTMENT (OUTPATIENT)
Dept: RHEUMATOLOGY | Facility: CLINIC | Age: 28
End: 2018-06-18

## 2018-07-02 ENCOUNTER — APPOINTMENT (OUTPATIENT)
Dept: RHEUMATOLOGY | Facility: CLINIC | Age: 28
End: 2018-07-02

## 2018-07-05 ENCOUNTER — RX RENEWAL (OUTPATIENT)
Age: 28
End: 2018-07-05

## 2018-07-17 ENCOUNTER — APPOINTMENT (OUTPATIENT)
Dept: RHEUMATOLOGY | Facility: CLINIC | Age: 28
End: 2018-07-17
Payer: MEDICARE

## 2018-07-17 VITALS
OXYGEN SATURATION: 99 % | SYSTOLIC BLOOD PRESSURE: 129 MMHG | BODY MASS INDEX: 22.34 KG/M2 | DIASTOLIC BLOOD PRESSURE: 85 MMHG | HEIGHT: 66 IN | WEIGHT: 139 LBS | TEMPERATURE: 98.8 F

## 2018-07-17 DIAGNOSIS — Z79.899 OTHER LONG TERM (CURRENT) DRUG THERAPY: ICD-10-CM

## 2018-07-17 DIAGNOSIS — N18.6 END STAGE RENAL DISEASE: ICD-10-CM

## 2018-07-17 DIAGNOSIS — D64.9 ANEMIA, UNSPECIFIED: ICD-10-CM

## 2018-07-17 PROCEDURE — 99215 OFFICE O/P EST HI 40 MIN: CPT

## 2018-07-20 LAB
ALBUMIN SERPL ELPH-MCNC: 4.8 G/DL
ALP BLD-CCNC: 105 U/L
ALT SERPL-CCNC: 9 U/L
ANION GAP SERPL CALC-SCNC: 21 MMOL/L
APPEARANCE: CLEAR
AST SERPL-CCNC: 17 U/L
BACTERIA: NEGATIVE
BASOPHILS # BLD AUTO: 0.01 K/UL
BASOPHILS NFR BLD AUTO: 0.2 %
BILIRUB SERPL-MCNC: 0.4 MG/DL
BILIRUBIN URINE: NEGATIVE
BLOOD URINE: NEGATIVE
BUN SERPL-MCNC: 32 MG/DL
C3 SERPL-MCNC: 85 MG/DL
C4 SERPL-MCNC: 24 MG/DL
CALCIUM SERPL-MCNC: 8.9 MG/DL
CHLORIDE SERPL-SCNC: 95 MMOL/L
CO2 SERPL-SCNC: 26 MMOL/L
COLOR: YELLOW
CREAT SERPL-MCNC: 9.95 MG/DL
DSDNA AB SER-ACNC: 22 IU/ML
EOSINOPHIL # BLD AUTO: 0.02 K/UL
EOSINOPHIL NFR BLD AUTO: 0.5 %
GLUCOSE QUALITATIVE U: NEGATIVE MG/DL
GLUCOSE SERPL-MCNC: 77 MG/DL
HAPTOGLOB SERPL-MCNC: 92 MG/DL
HCT VFR BLD CALC: 36 %
HGB BLD-MCNC: 11.2 G/DL
HYALINE CASTS: 4 /LPF
IMM GRANULOCYTES NFR BLD AUTO: 1.4 %
KETONES URINE: NEGATIVE
LEUKOCYTE ESTERASE URINE: NEGATIVE
LYMPHOCYTES # BLD AUTO: 0.8 K/UL
LYMPHOCYTES NFR BLD AUTO: 18.1 %
MAN DIFF?: NORMAL
MCHC RBC-ENTMCNC: 29.6 PG
MCHC RBC-ENTMCNC: 31.1 GM/DL
MCV RBC AUTO: 95 FL
MICROSCOPIC-UA: NORMAL
MONOCYTES # BLD AUTO: 0.36 K/UL
MONOCYTES NFR BLD AUTO: 8.1 %
NEUTROPHILS # BLD AUTO: 3.17 K/UL
NEUTROPHILS NFR BLD AUTO: 71.7 %
NITRITE URINE: NEGATIVE
PH URINE: >8.5
PLATELET # BLD AUTO: 264 K/UL
POTASSIUM SERPL-SCNC: 3.8 MMOL/L
PROT SERPL-MCNC: 7.4 G/DL
PROTEIN URINE: 300 MG/DL
RBC # BLD: 3.79 M/UL
RBC # FLD: 20 %
RED BLOOD CELLS URINE: 1 /HPF
SODIUM SERPL-SCNC: 142 MMOL/L
SPECIFIC GRAVITY URINE: 1.01
SQUAMOUS EPITHELIAL CELLS: 13 /HPF
UROBILINOGEN URINE: NEGATIVE MG/DL
WBC # FLD AUTO: 4.42 K/UL
WHITE BLOOD CELLS URINE: 1 /HPF

## 2018-08-23 ENCOUNTER — APPOINTMENT (OUTPATIENT)
Dept: CARDIOLOGY | Facility: HOSPITAL | Age: 28
End: 2018-08-23

## 2018-08-23 ENCOUNTER — APPOINTMENT (OUTPATIENT)
Dept: DERMATOLOGY | Facility: CLINIC | Age: 28
End: 2018-08-23
Payer: MEDICARE

## 2018-08-23 DIAGNOSIS — L65.9 NONSCARRING HAIR LOSS, UNSPECIFIED: ICD-10-CM

## 2018-08-23 PROCEDURE — 99214 OFFICE O/P EST MOD 30 MIN: CPT

## 2018-09-25 ENCOUNTER — RX RENEWAL (OUTPATIENT)
Age: 28
End: 2018-09-25

## 2018-09-28 ENCOUNTER — APPOINTMENT (OUTPATIENT)
Dept: RHEUMATOLOGY | Facility: CLINIC | Age: 28
End: 2018-09-28
Payer: MEDICARE

## 2018-09-28 VITALS
OXYGEN SATURATION: 98 % | HEIGHT: 66 IN | SYSTOLIC BLOOD PRESSURE: 149 MMHG | DIASTOLIC BLOOD PRESSURE: 97 MMHG | BODY MASS INDEX: 22.02 KG/M2 | TEMPERATURE: 98.5 F | WEIGHT: 137 LBS | HEART RATE: 82 BPM

## 2018-09-28 PROCEDURE — 99214 OFFICE O/P EST MOD 30 MIN: CPT

## 2018-09-28 RX ORDER — MOMETASONE FUROATE 1 MG/G
0.1 CREAM TOPICAL
Qty: 1 | Refills: 0 | Status: DISCONTINUED | COMMUNITY
Start: 2018-02-14 | End: 2018-09-28

## 2018-09-28 RX ORDER — HYDROMORPHONE HYDROCHLORIDE 4 MG/1
4 TABLET ORAL
Qty: 18 | Refills: 0 | Status: DISCONTINUED | COMMUNITY
Start: 2018-02-10 | End: 2018-09-28

## 2018-09-29 LAB
ALBUMIN SERPL ELPH-MCNC: 4.4 G/DL
ALP BLD-CCNC: 149 U/L
ALT SERPL-CCNC: 11 U/L
ANION GAP SERPL CALC-SCNC: 20 MMOL/L
APPEARANCE: CLEAR
AST SERPL-CCNC: 22 U/L
BACTERIA: ABNORMAL
BASOPHILS # BLD AUTO: 0.02 K/UL
BASOPHILS NFR BLD AUTO: 0.5 %
BILIRUB SERPL-MCNC: 0.4 MG/DL
BILIRUBIN URINE: NEGATIVE
BLOOD URINE: NEGATIVE
BUN SERPL-MCNC: 23 MG/DL
C3 SERPL-MCNC: 67 MG/DL
C4 SERPL-MCNC: 22 MG/DL
CALCIUM SERPL-MCNC: 8 MG/DL
CHLORIDE SERPL-SCNC: 96 MMOL/L
CO2 SERPL-SCNC: 26 MMOL/L
COLOR: YELLOW
CREAT SERPL-MCNC: 6.38 MG/DL
CREAT SPEC-SCNC: 44 MG/DL
CREAT/PROT UR: 4.5 RATIO
DEPRECATED KAPPA LC FREE/LAMBDA SER: 1.37 RATIO
EOSINOPHIL # BLD AUTO: 0.06 K/UL
EOSINOPHIL NFR BLD AUTO: 1.6 %
FERRITIN SERPL-MCNC: 316 NG/ML
GLUCOSE QUALITATIVE U: NEGATIVE MG/DL
GLUCOSE SERPL-MCNC: 66 MG/DL
HBV CORE IGG+IGM SER QL: NONREACTIVE
HBV CORE IGM SER QL: NONREACTIVE
HBV SURFACE AB SER QL: REACTIVE
HBV SURFACE AG SER QL: NONREACTIVE
HCT VFR BLD CALC: 41.6 %
HCV AB SER QL: NONREACTIVE
HCV S/CO RATIO: 0.05 S/CO
HGB BLD-MCNC: 13 G/DL
HYALINE CASTS: 3 /LPF
IGA SER QL IEP: 318 MG/DL
IGG SER QL IEP: 991 MG/DL
IGM SER QL IEP: 30 MG/DL
IMM GRANULOCYTES NFR BLD AUTO: 0.3 %
KAPPA LC CSF-MCNC: 16.95 MG/DL
KAPPA LC SERPL-MCNC: 23.28 MG/DL
KETONES URINE: NEGATIVE
LEUKOCYTE ESTERASE URINE: NEGATIVE
LYMPHOCYTES # BLD AUTO: 0.62 K/UL
LYMPHOCYTES NFR BLD AUTO: 16.5 %
MAN DIFF?: NORMAL
MCHC RBC-ENTMCNC: 30.4 PG
MCHC RBC-ENTMCNC: 31.3 GM/DL
MCV RBC AUTO: 97.4 FL
MICROSCOPIC-UA: NORMAL
MONOCYTES # BLD AUTO: 0.61 K/UL
MONOCYTES NFR BLD AUTO: 16.3 %
NEUTROPHILS # BLD AUTO: 2.43 K/UL
NEUTROPHILS NFR BLD AUTO: 64.8 %
NITRITE URINE: NEGATIVE
PH URINE: >8.5
PLATELET # BLD AUTO: 168 K/UL
POTASSIUM SERPL-SCNC: 5.3 MMOL/L
PROT SERPL-MCNC: 7 G/DL
PROT UR-MCNC: 201 MG/DL
PROTEIN URINE: 300 MG/DL
RBC # BLD: 4.27 M/UL
RBC # FLD: 16.6 %
RED BLOOD CELLS URINE: 2 /HPF
SODIUM SERPL-SCNC: 143 MMOL/L
SPECIFIC GRAVITY URINE: 1.01
SQUAMOUS EPITHELIAL CELLS: 24 /HPF
UROBILINOGEN URINE: NEGATIVE MG/DL
WBC # FLD AUTO: 3.75 K/UL
WHITE BLOOD CELLS URINE: 4 /HPF

## 2018-09-30 LAB
DSDNA AB SER-ACNC: 42 IU/ML
M TB IFN-G BLD-IMP: NEGATIVE
QUANTIFERON TB PLUS MITOGEN MINUS NIL: 3.11 IU/ML
QUANTIFERON TB PLUS NIL: 0.1 IU/ML
QUANTIFERON TB PLUS TB1 MINUS NIL: -0.04 IU/ML
QUANTIFERON TB PLUS TB2 MINUS NIL: -0.03 IU/ML

## 2018-10-11 ENCOUNTER — RX RENEWAL (OUTPATIENT)
Age: 28
End: 2018-10-11

## 2018-10-16 ENCOUNTER — RX RENEWAL (OUTPATIENT)
Age: 28
End: 2018-10-16

## 2018-12-08 LAB
POTASSIUM SERPL-MCNC: 6.3 MMOL/L — CRITICAL HIGH (ref 3.5–5.3)
POTASSIUM SERPL-SCNC: 6.3 MMOL/L — CRITICAL HIGH (ref 3.5–5.3)

## 2018-12-15 LAB
POTASSIUM SERPL-MCNC: 4.6 MMOL/L — SIGNIFICANT CHANGE UP (ref 3.5–5.3)
POTASSIUM SERPL-SCNC: 4.6 MMOL/L — SIGNIFICANT CHANGE UP (ref 3.5–5.3)

## 2018-12-18 ENCOUNTER — RX RENEWAL (OUTPATIENT)
Age: 28
End: 2018-12-18

## 2018-12-19 ENCOUNTER — RX RENEWAL (OUTPATIENT)
Age: 28
End: 2018-12-19

## 2018-12-22 LAB
CALCIUM SERPL-MCNC: 8 MG/DL — LOW (ref 8.4–10.5)
PHOSPHATE SERPL-MCNC: 3.8 MG/DL — SIGNIFICANT CHANGE UP (ref 2.5–4.5)

## 2018-12-23 LAB — HGB BLD-MCNC: 9.9 G/DL — LOW (ref 11.5–15.5)

## 2018-12-26 ENCOUNTER — MESSAGE (OUTPATIENT)
Age: 28
End: 2018-12-26

## 2019-02-26 ENCOUNTER — RX RENEWAL (OUTPATIENT)
Age: 29
End: 2019-02-26

## 2019-03-18 ENCOUNTER — APPOINTMENT (OUTPATIENT)
Dept: RHEUMATOLOGY | Facility: CLINIC | Age: 29
End: 2019-03-18
Payer: MEDICARE

## 2019-03-27 ENCOUNTER — APPOINTMENT (OUTPATIENT)
Dept: RHEUMATOLOGY | Facility: CLINIC | Age: 29
End: 2019-03-27
Payer: MEDICARE

## 2019-03-27 VITALS
HEART RATE: 86 BPM | WEIGHT: 136 LBS | SYSTOLIC BLOOD PRESSURE: 126 MMHG | OXYGEN SATURATION: 98 % | HEIGHT: 66 IN | BODY MASS INDEX: 21.86 KG/M2 | TEMPERATURE: 98.1 F | DIASTOLIC BLOOD PRESSURE: 84 MMHG | RESPIRATION RATE: 16 BRPM

## 2019-03-27 PROCEDURE — 99215 OFFICE O/P EST HI 40 MIN: CPT

## 2019-03-27 RX ORDER — BELIMUMAB 200 MG/ML
200 SOLUTION SUBCUTANEOUS
Qty: 12 | Refills: 1 | Status: DISCONTINUED | COMMUNITY
Start: 2018-02-20 | End: 2018-12-27

## 2019-03-27 RX ORDER — PANTOPRAZOLE 20 MG/1
20 TABLET, DELAYED RELEASE ORAL
Qty: 30 | Refills: 0 | Status: COMPLETED | COMMUNITY
Start: 2019-03-23

## 2019-03-27 RX ORDER — FLUOCINONIDE 0.5 MG/G
0.05 CREAM TOPICAL
Qty: 60 | Refills: 0 | Status: DISCONTINUED | COMMUNITY
Start: 2017-04-27 | End: 2019-03-27

## 2019-03-27 RX ORDER — CINACALCET HYDROCHLORIDE 30 MG/1
30 TABLET, COATED ORAL DAILY
Qty: 90 | Refills: 3 | Status: DISCONTINUED | COMMUNITY
Start: 2017-03-21 | End: 2019-03-27

## 2019-03-27 RX ORDER — SULFAMETHOXAZOLE AND TRIMETHOPRIM 400; 80 MG/1; MG/1
400-80 TABLET ORAL
Qty: 30 | Refills: 0 | Status: ACTIVE | COMMUNITY
Start: 2019-01-04

## 2019-03-27 RX ORDER — NYSTATIN 100000 [USP'U]/ML
100000 SUSPENSION ORAL
Qty: 600 | Refills: 0 | Status: COMPLETED | COMMUNITY
Start: 2019-02-02

## 2019-03-27 RX ORDER — CALCIUM ACETATE 667 MG/1
667 CAPSULE ORAL 3 TIMES DAILY
Qty: 90 | Refills: 5 | Status: DISCONTINUED | COMMUNITY
Start: 2018-04-09 | End: 2019-03-27

## 2019-03-27 RX ORDER — TRAMADOL HYDROCHLORIDE 50 MG/1
50 TABLET, COATED ORAL
Qty: 45 | Refills: 0 | Status: COMPLETED | COMMUNITY
Start: 2019-01-23

## 2019-03-27 RX ORDER — VALGANCICLOVIR HYDROCHLORIDE 450 MG/1
450 TABLET ORAL
Qty: 30 | Refills: 0 | Status: ACTIVE | COMMUNITY
Start: 2019-01-04

## 2019-03-27 RX ORDER — NIFEDIPINE 60 MG/1
60 TABLET, EXTENDED RELEASE ORAL TWICE DAILY
Qty: 90 | Refills: 2 | Status: ACTIVE | COMMUNITY
Start: 2017-05-30

## 2019-03-27 RX ORDER — HALOBETASOL PROPIONATE 0.5 MG/G
0.05 OINTMENT TOPICAL
Qty: 1 | Refills: 1 | Status: DISCONTINUED | COMMUNITY
Start: 2018-02-14 | End: 2019-03-27

## 2019-03-27 RX ORDER — TIZANIDINE 4 MG/1
4 TABLET ORAL
Qty: 42 | Refills: 0 | Status: COMPLETED | COMMUNITY
Start: 2019-01-18

## 2019-03-27 RX ORDER — SODIUM POLYSTYRENE SULFONATE 4.1 MEQ/G
POWDER, FOR SUSPENSION ORAL; RECTAL
Qty: 454 | Refills: 0 | Status: COMPLETED | COMMUNITY
Start: 2019-01-23

## 2019-03-27 RX ORDER — BLOOD-GLUCOSE METER
KIT MISCELLANEOUS
Qty: 1 | Refills: 0 | Status: DISCONTINUED | COMMUNITY
Start: 2018-03-14 | End: 2019-03-27

## 2019-03-27 RX ORDER — TRAMADOL HYDROCHLORIDE AND ACETAMINOPHEN 37.5; 325 MG/1; MG/1
37.5-325 TABLET, FILM COATED ORAL
Qty: 15 | Refills: 0 | Status: DISCONTINUED | COMMUNITY
Start: 2018-08-31 | End: 2019-03-27

## 2019-03-27 RX ORDER — PREDNISONE 2.5 MG/1
2.5 TABLET ORAL
Qty: 270 | Refills: 0 | Status: DISCONTINUED | COMMUNITY
Start: 2018-02-20 | End: 2019-03-27

## 2019-03-27 RX ORDER — VANCOMYCIN
50 KIT
Qty: 150 | Refills: 0 | Status: DISCONTINUED | COMMUNITY
Start: 2017-12-11 | End: 2019-03-27

## 2019-03-27 RX ORDER — ZOSTER VACCINE RECOMBINANT, ADJUVANTED 50 MCG/0.5
50 KIT INTRAMUSCULAR
Qty: 1 | Refills: 1 | Status: DISCONTINUED | COMMUNITY
Start: 2018-03-27 | End: 2019-03-27

## 2019-03-27 RX ORDER — LISINOPRIL 5 MG/1
5 TABLET ORAL DAILY
Qty: 90 | Refills: 1 | Status: DISCONTINUED | COMMUNITY
Start: 2018-03-14 | End: 2019-03-27

## 2019-03-27 RX ORDER — FLUOCINONIDE 0.5 MG/ML
0.05 SOLUTION TOPICAL
Qty: 1 | Refills: 2 | Status: DISCONTINUED | COMMUNITY
Start: 2018-08-23 | End: 2019-03-27

## 2019-03-31 ENCOUNTER — MOBILE ON CALL (OUTPATIENT)
Age: 29
End: 2019-03-31

## 2019-04-05 LAB
25(OH)D3 SERPL-MCNC: 9 NG/ML
ALBUMIN SERPL ELPH-MCNC: 4.8 G/DL
ALP BLD-CCNC: 156 U/L
ALT SERPL-CCNC: 8 U/L
ANION GAP SERPL CALC-SCNC: 15 MMOL/L
APPEARANCE: CLEAR
AST SERPL-CCNC: 17 U/L
BACTERIA: NEGATIVE
BASOPHILS # BLD AUTO: 0.01 K/UL
BASOPHILS NFR BLD AUTO: 0.2 %
BILIRUB SERPL-MCNC: 0.3 MG/DL
BILIRUBIN URINE: NEGATIVE
BLOOD URINE: NEGATIVE
BUN SERPL-MCNC: 21 MG/DL
C3 SERPL-MCNC: 122 MG/DL
C4 SERPL-MCNC: 39 MG/DL
CALCIUM SERPL-MCNC: 10.8 MG/DL
CHLORIDE SERPL-SCNC: 100 MMOL/L
CO2 SERPL-SCNC: 26 MMOL/L
COLOR: NORMAL
CREAT SERPL-MCNC: 1.47 MG/DL
CREAT SPEC-SCNC: 52 MG/DL
CREAT/PROT UR: 0.6 RATIO
DEPRECATED KAPPA LC FREE/LAMBDA SER: 1.29 RATIO
DSDNA AB SER-ACNC: 39 IU/ML
EOSINOPHIL # BLD AUTO: 0.02 K/UL
EOSINOPHIL NFR BLD AUTO: 0.4 %
GLUCOSE QUALITATIVE U: NEGATIVE
GLUCOSE SERPL-MCNC: 101 MG/DL
HCT VFR BLD CALC: 37.7 %
HGB BLD-MCNC: 12.3 G/DL
HYALINE CASTS: 0 /LPF
IGA SER QL IEP: 413 MG/DL
IGG SER QL IEP: 991 MG/DL
IGM SER QL IEP: 32 MG/DL
IMM GRANULOCYTES NFR BLD AUTO: 0.2 %
KAPPA LC CSF-MCNC: 2.04 MG/DL
KAPPA LC SERPL-MCNC: 2.63 MG/DL
KETONES URINE: NEGATIVE
LEUKOCYTE ESTERASE URINE: NEGATIVE
LYMPHOCYTES # BLD AUTO: 0.19 K/UL
LYMPHOCYTES NFR BLD AUTO: 4.2 %
MAN DIFF?: NORMAL
MCHC RBC-ENTMCNC: 30.1 PG
MCHC RBC-ENTMCNC: 32.6 GM/DL
MCV RBC AUTO: 92.2 FL
MICROSCOPIC-UA: NORMAL
MONOCYTES # BLD AUTO: 0.31 K/UL
MONOCYTES NFR BLD AUTO: 6.9 %
NEUTROPHILS # BLD AUTO: 3.98 K/UL
NEUTROPHILS NFR BLD AUTO: 88.1 %
NITRITE URINE: NEGATIVE
PH URINE: 6.5
PLATELET # BLD AUTO: 283 K/UL
POTASSIUM SERPL-SCNC: 3.9 MMOL/L
PROT SERPL-MCNC: 7.5 G/DL
PROT UR-MCNC: 30 MG/DL
PROTEIN URINE: ABNORMAL
RBC # BLD: 4.09 M/UL
RBC # FLD: 12 %
RED BLOOD CELLS URINE: 1 /HPF
SODIUM SERPL-SCNC: 141 MMOL/L
SPECIFIC GRAVITY URINE: 1.01
SQUAMOUS EPITHELIAL CELLS: 2 /HPF
UROBILINOGEN URINE: NORMAL
WBC # FLD AUTO: 4.52 K/UL
WHITE BLOOD CELLS URINE: 1 /HPF

## 2019-05-29 ENCOUNTER — APPOINTMENT (OUTPATIENT)
Dept: RHEUMATOLOGY | Facility: CLINIC | Age: 29
End: 2019-05-29

## 2019-06-21 ENCOUNTER — APPOINTMENT (OUTPATIENT)
Dept: RHEUMATOLOGY | Facility: CLINIC | Age: 29
End: 2019-06-21

## 2019-07-23 NOTE — H&P ADULT - SKIN/BREAST
Jhony Rodriguez is a 65 y.o. male here for a non-provider visit for testosterone injection.    Reason for injection: low testosterone  Order in MAR?: Yes  Patient supplied?:No  Minimum interval has been met for this injection (per MAR order): Yes    Order and dose verified by: HEBERT  Patient tolerated injection and no adverse effects were observed or reported: Yes    # of Administrations remaining in MAR: 4    
details…

## 2019-08-19 NOTE — H&P ADULT - CONSTITUTIONAL
Quality 431: Preventive Care And Screening: Unhealthy Alcohol Use - Screening: Patient screened for unhealthy alcohol use using a single question and scores less than 2 times per year Quality 130: Documentation Of Current Medications In The Medical Record: Current Medications Documented Quality 47: Advance Care Plan: Advance Care Planning discussed and documented in the medical record; patient did not wish or was not able to name a surrogate decision maker or provide an advance care plan. Detail Level: Detailed Quality 111:Pneumonia Vaccination Status For Older Adults: Pneumococcal Vaccination Previously Received Quality 226: Preventive Care And Screening: Tobacco Use: Screening And Cessation Intervention: Patient screened for tobacco use and is an ex/non-smoker detailed exam

## 2019-08-20 ENCOUNTER — MESSAGE (OUTPATIENT)
Age: 29
End: 2019-08-20

## 2019-09-03 ENCOUNTER — APPOINTMENT (OUTPATIENT)
Dept: RHEUMATOLOGY | Facility: CLINIC | Age: 29
End: 2019-09-03
Payer: MEDICARE

## 2019-09-03 ENCOUNTER — APPOINTMENT (OUTPATIENT)
Dept: CARDIOLOGY | Facility: CLINIC | Age: 29
End: 2019-09-03
Payer: MEDICARE

## 2019-09-03 ENCOUNTER — LABORATORY RESULT (OUTPATIENT)
Age: 29
End: 2019-09-03

## 2019-09-03 VITALS
TEMPERATURE: 98.1 F | SYSTOLIC BLOOD PRESSURE: 121 MMHG | OXYGEN SATURATION: 98 % | WEIGHT: 138 LBS | BODY MASS INDEX: 22.27 KG/M2 | DIASTOLIC BLOOD PRESSURE: 86 MMHG | HEART RATE: 73 BPM

## 2019-09-03 VITALS
OXYGEN SATURATION: 100 % | DIASTOLIC BLOOD PRESSURE: 80 MMHG | SYSTOLIC BLOOD PRESSURE: 110 MMHG | BODY MASS INDEX: 20.66 KG/M2 | WEIGHT: 128 LBS | HEART RATE: 50 BPM

## 2019-09-03 DIAGNOSIS — R94.31 ABNORMAL ELECTROCARDIOGRAM [ECG] [EKG]: ICD-10-CM

## 2019-09-03 DIAGNOSIS — R53.82 CHRONIC FATIGUE, UNSPECIFIED: ICD-10-CM

## 2019-09-03 DIAGNOSIS — T86.10 UNSPECIFIED COMPLICATION OF KIDNEY TRANSPLANT: ICD-10-CM

## 2019-09-03 DIAGNOSIS — L93.0 DISCOID LUPUS ERYTHEMATOSUS: ICD-10-CM

## 2019-09-03 DIAGNOSIS — Z86.19 PERSONAL HISTORY OF OTHER INFECTIOUS AND PARASITIC DISEASES: ICD-10-CM

## 2019-09-03 DIAGNOSIS — R07.89 OTHER CHEST PAIN: ICD-10-CM

## 2019-09-03 PROCEDURE — 99215 OFFICE O/P EST HI 40 MIN: CPT

## 2019-09-03 PROCEDURE — 99205 OFFICE O/P NEW HI 60 MIN: CPT

## 2019-09-03 RX ORDER — TACROLIMUS 4 MG/1
4 TABLET, EXTENDED RELEASE ORAL
Refills: 0 | Status: ACTIVE | COMMUNITY
Start: 2019-03-27

## 2019-09-03 RX ORDER — MYCOPHENOLATE MOFETIL 500 MG/1
500 TABLET, FILM COATED ORAL
Qty: 180 | Refills: 3 | Status: DISCONTINUED | COMMUNITY
Start: 2019-03-27 | End: 2019-09-03

## 2019-09-03 RX ORDER — PANTOPRAZOLE 20 MG/1
20 TABLET, DELAYED RELEASE ORAL TWICE DAILY
Refills: 0 | Status: ACTIVE | COMMUNITY
Start: 2018-08-30

## 2019-09-03 RX ORDER — BELATACEPT 250 MG/1
250 INJECTION, POWDER, LYOPHILIZED, FOR SOLUTION INTRAVENOUS
Refills: 0 | Status: ACTIVE | COMMUNITY
Start: 2019-09-03

## 2019-09-03 NOTE — PHYSICAL EXAM
[General Appearance - Well Developed] : well developed [General Appearance - Well Nourished] : well nourished [General Appearance - In No Acute Distress] : no acute distress [Normal Conjunctiva] : the conjunctiva exhibited no abnormalities [No Oral Pallor] : no oral pallor [Normal Jugular Venous V Waves Present] : normal jugular venous V waves present [Heart Sounds] : normal S1 and S2 [Murmurs] : no murmurs present [Edema] : no peripheral edema present [FreeTextEntry1] : Right arm fistula [Respiration, Rhythm And Depth] : normal respiratory rhythm and effort [Auscultation Breath Sounds / Voice Sounds] : lungs were clear to auscultation bilaterally [Bowel Sounds] : normal bowel sounds [Abdomen Soft] : soft [Abnormal Walk] : normal gait [Cyanosis, Localized] : no localized cyanosis [Skin Turgor] : normal skin turgor [Oriented To Time, Place, And Person] : oriented to person, place, and time [Impaired Insight] : insight and judgment were intact [Affect] : the affect was normal

## 2019-09-03 NOTE — DISCUSSION/SUMMARY
[FreeTextEntry1] : -year-old with a history of lupus serositis/pericarditis, end-stage renal disease status post renal transplant in January 2019 who presents with progressive exertional abdominal/chest discomfort of uncertain etiology.  Her ECG is abnormal, though compared with her previous one it is less alarming.  Of most concern is the exertional component to her symptoms which do seem to point to a vascular source.  I have suggested that she undergo stress echocardiography in order to reproduce her symptoms and evaluate her cardiac function during her pain episode.  She will forward me her prior exercise stress test for comparison sake.\par If no significant cardiovascular abnormalities found, further vascular evaluation of her abdominal region is appropriate.\par Echocardiography will exclude any pericardial effusion, though this is unlikely.\par Thank you for reviewing this case with me today and it was a pleasure seeing your patient.\par

## 2019-09-03 NOTE — HISTORY OF PRESENT ILLNESS
[FreeTextEntry1] : Dear Gladys,\par Thank you for referring her for urgent cardiovascular evaluation due to an abnormal ECG and exertional abdominal/chest pains.\par She is a 28-year-old with a history of lupus, hypertension and renal insufficiency who underwent a renal transplant in January 2019.  She reports abdominal pain since February of this year.\par She describes a heaviness or fullness in her epigastrium/lower sternum that diffuses to her abdomen and around her back that occurs after walking 2 blocks there is some associated diaphoresis but she denies any nausea or dyspnea.\par The discomfort lasts for about 20 minutes and resolves spontaneously.  There is some inconsistency in the distance that is required to make the discomfort appear.\par She is undergone GI work-up and was scheduled for an endoscopy today.  When she was seen in your office, given her abnormal ECG he sent her to me for urgent evaluation.\par The patient does have a remote history of pericardial effusion that was related to lupus serositis and ESRD.\par Her echocardiogram done on March 2019 showed normal left ventricular systolic function with LVH and only a trace pericardial effusion.\par ECG done in March showed sinus rhythm with T wave inversions in lateral leads.\par She denies any personal history of coronary artery disease, diabetes mellitus, cigarette smoking.\par \par She reports that prior to her transplant she underwent a treadmill stress test in December 2018 that was reportedly normal.\par smokes weed qod.

## 2019-09-04 ENCOUNTER — APPOINTMENT (OUTPATIENT)
Dept: CARDIOLOGY | Facility: CLINIC | Age: 29
End: 2019-09-04
Payer: MEDICARE

## 2019-09-04 PROCEDURE — 93351 STRESS TTE COMPLETE: CPT

## 2019-09-04 PROCEDURE — 93325 DOPPLER ECHO COLOR FLOW MAPG: CPT

## 2019-09-04 PROCEDURE — 93320 DOPPLER ECHO COMPLETE: CPT

## 2019-09-05 LAB
ALBUMIN SERPL ELPH-MCNC: 4.5 G/DL
ALP BLD-CCNC: 109 U/L
ALT SERPL-CCNC: 6 U/L
ANION GAP SERPL CALC-SCNC: 13 MMOL/L
APPEARANCE: CLEAR
AST SERPL-CCNC: 14 U/L
BACTERIA: NEGATIVE
BASOPHILS # BLD AUTO: 0.04 K/UL
BASOPHILS NFR BLD AUTO: 3.5 %
BILIRUB SERPL-MCNC: 0.2 MG/DL
BILIRUBIN URINE: NEGATIVE
BLOOD URINE: NEGATIVE
BUN SERPL-MCNC: 23 MG/DL
C3 SERPL-MCNC: 79 MG/DL
C4 SERPL-MCNC: 29 MG/DL
CALCIUM SERPL-MCNC: 10.2 MG/DL
CHLORIDE SERPL-SCNC: 105 MMOL/L
CK SERPL-CCNC: 32 U/L
CO2 SERPL-SCNC: 23 MMOL/L
COLOR: NORMAL
CREAT SERPL-MCNC: 2.23 MG/DL
CREAT SPEC-SCNC: 76 MG/DL
CREAT/PROT UR: 0.8 RATIO
DSDNA AB SER-ACNC: 22 IU/ML
EOSINOPHIL # BLD AUTO: 0.05 K/UL
EOSINOPHIL NFR BLD AUTO: 4.3 %
GLUCOSE QUALITATIVE U: NEGATIVE
GLUCOSE SERPL-MCNC: 78 MG/DL
HCT VFR BLD CALC: 34.1 %
HGB BLD-MCNC: 10.2 G/DL
HYALINE CASTS: 2 /LPF
KETONES URINE: NEGATIVE
LEUKOCYTE ESTERASE URINE: NEGATIVE
LYMPHOCYTES # BLD AUTO: 0.14 K/UL
LYMPHOCYTES NFR BLD AUTO: 12.2 %
MAN DIFF?: NORMAL
MCHC RBC-ENTMCNC: 29.7 PG
MCHC RBC-ENTMCNC: 29.9 GM/DL
MCV RBC AUTO: 99.4 FL
MICROSCOPIC-UA: NORMAL
MONOCYTES # BLD AUTO: 0.09 K/UL
MONOCYTES NFR BLD AUTO: 7.8 %
NEUTROPHILS # BLD AUTO: 0.81 K/UL
NEUTROPHILS NFR BLD AUTO: 71.3 %
NITRITE URINE: NEGATIVE
PH URINE: 7.5
PLATELET # BLD AUTO: 276 K/UL
POTASSIUM SERPL-SCNC: 5 MMOL/L
PROT SERPL-MCNC: 6.8 G/DL
PROT UR-MCNC: 61 MG/DL
PROTEIN URINE: ABNORMAL
RBC # BLD: 3.43 M/UL
RBC # FLD: 14.7 %
RED BLOOD CELLS URINE: 1 /HPF
SODIUM SERPL-SCNC: 141 MMOL/L
SPECIFIC GRAVITY URINE: 1.01
SQUAMOUS EPITHELIAL CELLS: 6 /HPF
TACROLIMUS SERPL-MCNC: <2 NG/ML
UROBILINOGEN URINE: NORMAL
WBC # FLD AUTO: 1.13 K/UL
WHITE BLOOD CELLS URINE: 1 /HPF

## 2019-09-24 ENCOUNTER — RX RENEWAL (OUTPATIENT)
Age: 29
End: 2019-09-24

## 2019-09-24 RX ORDER — PREDNISONE 5 MG/1
5 TABLET ORAL
Qty: 30 | Refills: 3 | Status: ACTIVE | COMMUNITY
Start: 2018-07-05 | End: 1900-01-01

## 2019-10-11 ENCOUNTER — APPOINTMENT (OUTPATIENT)
Dept: RHEUMATOLOGY | Facility: CLINIC | Age: 29
End: 2019-10-11

## 2019-12-16 ENCOUNTER — LABORATORY RESULT (OUTPATIENT)
Age: 29
End: 2019-12-16

## 2019-12-16 ENCOUNTER — APPOINTMENT (OUTPATIENT)
Dept: RHEUMATOLOGY | Facility: CLINIC | Age: 29
End: 2019-12-16
Payer: MEDICARE

## 2019-12-16 VITALS
HEART RATE: 54 BPM | BODY MASS INDEX: 20.09 KG/M2 | WEIGHT: 125 LBS | TEMPERATURE: 98.4 F | SYSTOLIC BLOOD PRESSURE: 111 MMHG | OXYGEN SATURATION: 99 % | HEIGHT: 66 IN | DIASTOLIC BLOOD PRESSURE: 78 MMHG | RESPIRATION RATE: 14 BRPM

## 2019-12-16 DIAGNOSIS — Z94.0 KIDNEY TRANSPLANT STATUS: ICD-10-CM

## 2019-12-16 DIAGNOSIS — M32.9 SYSTEMIC LUPUS ERYTHEMATOSUS, UNSPECIFIED: ICD-10-CM

## 2019-12-16 DIAGNOSIS — Z79.899 OTHER LONG TERM (CURRENT) DRUG THERAPY: ICD-10-CM

## 2019-12-16 PROCEDURE — 99214 OFFICE O/P EST MOD 30 MIN: CPT

## 2019-12-19 LAB
ALBUMIN SERPL ELPH-MCNC: 4.3 G/DL
ALP BLD-CCNC: 84 U/L
ALT SERPL-CCNC: 8 U/L
ANION GAP SERPL CALC-SCNC: 10 MMOL/L
APPEARANCE: ABNORMAL
AST SERPL-CCNC: 17 U/L
BACTERIA: NEGATIVE
BASOPHILS # BLD AUTO: 0.06 K/UL
BASOPHILS NFR BLD AUTO: 2.6 %
BILIRUB SERPL-MCNC: 0.2 MG/DL
BILIRUBIN URINE: NEGATIVE
BLOOD URINE: NEGATIVE
BUN SERPL-MCNC: 15 MG/DL
C3 SERPL-MCNC: 91 MG/DL
C4 SERPL-MCNC: 35 MG/DL
CALCIUM SERPL-MCNC: 10.2 MG/DL
CHLORIDE SERPL-SCNC: 106 MMOL/L
CO2 SERPL-SCNC: 19 MMOL/L
COLOR: NORMAL
CREAT SERPL-MCNC: 1.55 MG/DL
CREAT SPEC-SCNC: 99 MG/DL
CREAT/PROT UR: 0.4 RATIO
CRP SERPL-MCNC: 0.26 MG/DL
DSDNA AB SER-ACNC: 24 IU/ML
EOSINOPHIL # BLD AUTO: 0.02 K/UL
EOSINOPHIL NFR BLD AUTO: 0.8 %
ERYTHROCYTE [SEDIMENTATION RATE] IN BLOOD BY WESTERGREN METHOD: 44 MM/HR
ESTIMATED AVERAGE GLUCOSE: 85 MG/DL
FERRITIN SERPL-MCNC: 133 NG/ML
GLUCOSE QUALITATIVE U: NEGATIVE
GLUCOSE SERPL-MCNC: 86 MG/DL
HBA1C MFR BLD HPLC: 4.6 %
HCT VFR BLD CALC: 36.9 %
HGB BLD-MCNC: 11.7 G/DL
HYALINE CASTS: 0 /LPF
KETONES URINE: NEGATIVE
LEUKOCYTE ESTERASE URINE: NEGATIVE
LYMPHOCYTES # BLD AUTO: 0.13 K/UL
LYMPHOCYTES NFR BLD AUTO: 5.2 %
MAN DIFF?: NORMAL
MCHC RBC-ENTMCNC: 31.7 GM/DL
MCHC RBC-ENTMCNC: 34.2 PG
MCV RBC AUTO: 107.9 FL
MICROSCOPIC-UA: NORMAL
MONOCYTES # BLD AUTO: 0.08 K/UL
MONOCYTES NFR BLD AUTO: 3.5 %
NEUTROPHILS # BLD AUTO: 2.11 K/UL
NEUTROPHILS NFR BLD AUTO: 86.1 %
NITRITE URINE: NEGATIVE
PH URINE: 6.5
PLATELET # BLD AUTO: 234 K/UL
POTASSIUM SERPL-SCNC: 5.1 MMOL/L
PROT SERPL-MCNC: 7 G/DL
PROT UR-MCNC: 35 MG/DL
PROTEIN URINE: ABNORMAL
RBC # BLD: 3.42 M/UL
RBC # FLD: 15.9 %
RED BLOOD CELLS URINE: 2 /HPF
SODIUM SERPL-SCNC: 135 MMOL/L
SPECIFIC GRAVITY URINE: 1.02
SQUAMOUS EPITHELIAL CELLS: 10 /HPF
TACROLIMUS SERPL-MCNC: 11.3 NG/ML
UROBILINOGEN URINE: NORMAL
WBC # FLD AUTO: 2.42 K/UL
WHITE BLOOD CELLS URINE: 1 /HPF

## 2020-01-13 ENCOUNTER — MESSAGE (OUTPATIENT)
Age: 30
End: 2020-01-13

## 2020-01-21 ENCOUNTER — APPOINTMENT (OUTPATIENT)
Dept: RHEUMATOLOGY | Facility: CLINIC | Age: 30
End: 2020-01-21

## 2021-01-15 NOTE — PATIENT PROFILE ADULT. - AS SC BRADEN MOISTURE
Patient did better with transfer to commode and back to bed at this time, but still required max assist with walker and proper posture instruction. Patient placed in prone position on left side at this time. (3) occasionally moist

## 2021-06-29 NOTE — H&P ADULT - [facility]
Medicare Wellness Visit  Plan for Preventive Care    A good way for you to stay healthy is to use preventive care. Medicare covers many services that can help you stay healthy. * The goal of these services is to find any health problems as quickly as possible. Finding problems early can help make them easier to treat. Your personal plan below lists the services you may need and when they are due. Health Maintenance Summary     Diabetes Foot Exam (Yearly)  Overdue since 2/19/2021    Medicare Wellness Visit (Yearly)  Overdue since 5/28/2021    Diabetes Eye Exam (Yearly)  Due soon on 7/15/2021    Shingles Vaccine (1 of 2)  Postponed until 12/21/2021    Diabetes A1C (Every 3 Months)  Ordered on 6/29/2021    DM/CKD GFR (Yearly)  Ordered on 6/29/2021    Depression Screening (Yearly)  Next due on 6/29/2022    Breast Cancer Screening (Every 2 Years)  Next due on 11/5/2022    Colonoscopy Risk (Every 5 Years)  Next due on 5/6/2024    Pneumococcal Vaccine 65+ (2 of 2 - PPSV23)  Next due on 11/14/2024    DTaP/Tdap/Td Vaccine (2 - Td or Tdap)  Next due on 3/24/2026    Osteoporosis Screening   Ordered on 6/29/2021    Hepatitis C Screening   Completed    Influenza Vaccine   Completed    COVID-19 Vaccine   Completed    Hepatitis B Vaccine   Aged Out    Meningococcal Vaccine   Aged Out    HPV Vaccine   Aged Out           Preventive Care for Women and Men    Heart Screenings (Cardiovascular):  Â· Blood tests are used to check your cholesterol, lipid and triglyceride levels. High levels can increase your risk for heart disease and stroke. High levels can be treated with medications, diet and exercise. Lowering your levels can help keep your heart and blood vessels healthy. Your provider will order these tests if they are needed. Â· An ultrasound is done to see if you have an abdominal aortic aneurysm (AAA). This is an enlargement of one of the main blood vessels that delivers blood to the body.    In the [place], 9,000 deaths are caused by AAA. You may not even know you have this problem and as many as 1 in 3 people will have a serious problem if it is not treated. Early diagnosis allows for more effective treatment and cure. If you have a family history of AAA or are a male age 70-76 who has smoked, you are at higher risk of an AAA. Your provider can order this test, if needed. Colorectal Screening:  Â· There are many tests that are used to check for cancer of your colon and rectum. You and your provider should discuss what test is best for you and when to have it done. Options include:  Â· Screening Colonoscopy: exam of the entire colon, seen through a flexible lighted tube. Â· Flexible Sigmoidoscopy: exam of the last third (sigmoid portion) of the colon and rectum, seen through a flexible lighted tube. Â· Cologuard DNA stool test: a sample of your stool is used to screen for cancer and unseen blood in your stool. Â· Fecal Occult Blood Test: a sample of your stool is studied to find any unseen blood    Flu Shot:  Â· An immunization that helps to prevent influenza (the flu). You should get this every year. The best time to get the shot is in the fall. Pneumococcal Shot:  â¢ Vaccines are available that can help prevent pneumococcal disease, which is any type of infection caused by Streptococcus pneumoniae bacteria. Their use can prevent some cases of pneumonia, meningitis, and sepsis. There are two types of pneumococcal vaccines:   o Conjugate vaccines (PCV-13 or Prevnar 13Â®) - helps protect against the 13 types of pneumococcal bacteria that are the most common causes of serious infections in children and adults. o Polysaccharide vaccine (PPSV23 or Qlwvemylx20Â®) - helps protect against 23 types of pneumococcal bacteria for patients who are recommended to get it. These vaccines should be given at least 12 months apart. A booster is usually not needed.      Hepatitis B Shot:  Â· An immunization that helps to protect people from getting Hepatitis B. Hepatitis B is a virus that spreads through contact with infected blood or body fluids. Many people with the virus do not have symptoms. The virus can lead to serious problems, such as liver disease. Some people are at higher risk than others. Your doctor will tell you if you need this shot. Diabetes Screening:  Â· A test to measure sugar (glucose) in your blood is called a fasting blood sugar. Fasting means you cannot have food or drink for at least 8 hours before the test. This test can detect diabetes long before you may notice symptoms. Glaucoma Screening:  Â· Glaucoma screening is performed by your eye doctor. The test measures the fluid pressure inside your eyes to determine if you have glaucoma. Hepatitis C Screening:  Â· A blood test to see if you have the hepatitis C virus. Hepatitis C attacks the liver and is a major cause of chronic liver disease. Medicare will cover a single screening for all adults born between [facility], or high risk patients (people who have injected illegal drugs or people who have had blood transfusions). High risk patients who continue to inject illegal drugs can be screened for Hepatitis C every year. Smoking and Tobacco-Use Cessation Counseling:  Â· Tobacco is the single greatest cause of disease and early death in our country today. Medication and counseling together can increase a personâs chance of quitting for good. Â· Medicare covers two quitting attempts per year, with four counseling sessions per attempt (eight sessions in a 12 month period)    Preventive Screening tests for Women    Screening Mammograms and Breast Exams:  Â· An x-ray of your breasts to check for breast cancer before you or your doctor may be able to feel it. If breast cancer is found early it can usually be treated with success. Pelvic Exams and Pap Tests:  Â· An exam to check for cervical and vaginal cancer.  A Pap test is a lab test in which cells are taken from your cervix and sent to the lab to look for signs of cervical cancer. If cancer of the cervix is found early, chances for a cure are good. Testing can generally end at age 72, or if a woman has a hysterectomy for a benign condition. Your provider may recommend more frequent testing if certain abnormal results are found. Bone Mass Measurements:  Â· A painless x-ray of your bone density to see if you are at risk for a broken bone. Bone density refers to the thickness of bones or how tightly the bone tissue is packed. Preventive Screening tests for Men    Prostate Screening:  Â· Should you have a prostate cancer test (PSA)? It is up to you to decide if you want a prostate cancer test. Talk to your clinician to find out if the test is right for you. Things for you to consider and talk about should include:  Â· Benefits and harms of the test  Â· Your family history  Â· How your race/ethnicity may influence the test  Â· If the test may impact other medical conditions you have  Â· Your values on screenings and treatments    *Medicare pays for many preventive services to keep you healthy. For some of these services, you might have to pay a deductible, coinsurance, and / or copayment. The amounts vary depending on the type of services you need and the kind of Medicare health plan you have.     For further details on screenings offered by Medicare please visit: [email or web address] no murmur/regular rate and rhythm/no rub

## 2021-10-19 NOTE — ED PROVIDER NOTE - CROS ED GU ALL NEG
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   Type of Surgery: Colosotmy   Surgery Date: 7/2017 revision  Surgeon:[facility]: [facility]    Purpose of this visit: Peristomal Complications    Pertinent Information: Patient concerned with peristomal denudement    Present for Teaching Session: Patient   Present: NA      Current Equipment:   Equipment:    Product # Brand Description   Pouch  Coloplast 2 pc Drainable   Flange   * convex   Paste Coloplast Coloplast Brava   Powder [id number] [employer]   Liquid Skin Barrier [id number] 3M Cavilon No Sting               Deodorizer   Coloplast   Optional pouch           Pouch Change Frequency: Twice weekly  Provider of Care: Emptying: Self Pouch Change: Self    ASSESSMENT  Stoma Size: Oval (did not measure - current pouch appropriate) inches  Protrusion: Flush  Stoma Appearance: Red  Mucocutaneous Juncture: Intact   Peristomal Skin: Denudement  Abdominal Assessment: No concerns    TREATMENT  Applied Stoma Powder, Applied No Sting Skin barrier and Applied: Paste ring around stoma to cover skin due to oval shape ad round precut pouch    INTERVENTIONS  Educated on peristomal skin treatment    INSTRUCTIONS GIVEN  Peristomal skin care    PLAN  Continue same Pouching System and New Pouching Procedure: See Outpatient Ostomy Instructions      Total Time Spent with Patient: 40 minutes       DISPLAY PLAN FREE TEXT negative...

## 2024-01-26 NOTE — PROGRESS NOTE ADULT - NSHPATTENDINGPLANDISCUSS_GEN_ALL_CORE
Consent: The patient's consent was obtained including but not limited to risks of crusting, scabbing, blistering, scarring, darker or lighter pigmentary change, recurrence, incomplete removal and infection. Post-Care Instructions: I reviewed with the patient in detail post-care instructions. Patient is to wear sunprotection, and avoid picking at any of the treated lesions. Pt may apply Vaseline to crusted or scabbing areas. Aperture Size (Optional): B Duration Of Freeze Thaw-Cycle (Seconds): 5 Detail Level: Zone Render Post-Care Instructions In Note?: no Show Applicator Variable?: Yes Number Of Freeze-Thaw Cycles: 1 freeze-thaw cycle Application Tool (Optional): Liquid Nitrogen Sprayer med team

## 2024-02-09 NOTE — H&P ADULT. - DIAGNOSTIC AND THERAPEUTIC PLAN DISCUSSED WITH
----- Message from Maria De Jesus Schilling NP sent at 2/9/2024 12:41 PM CST -----  Your blood count is normal.  Your EKG was normal.  
Pt informed.   
Dr Villafuerte

## 2024-10-18 NOTE — ED PROVIDER NOTE - CPE EDP RESP NORM
watery diarrhea on exam   CTAP findings as above    - IVF, replace electrolytes   - stool Cx , C diff, GI PCR, stool count   - c/w vanc , cefepime  as above normal...

## 2025-02-20 NOTE — ED PROVIDER NOTE - PEDAL EDEMA LATERALITY
[facility] provides services at a reduced cost to those who are determined to be eligible through [facility]’s financial assistance program. Information regarding [facility]’s financial assistance program can be found by going to [email or web address] or by calling [phone].
none